# Patient Record
Sex: FEMALE | Race: WHITE | Employment: OTHER | URBAN - NONMETROPOLITAN AREA
[De-identification: names, ages, dates, MRNs, and addresses within clinical notes are randomized per-mention and may not be internally consistent; named-entity substitution may affect disease eponyms.]

---

## 2017-01-24 DIAGNOSIS — I10 ESSENTIAL HYPERTENSION: Primary | ICD-10-CM

## 2017-01-24 RX ORDER — HYDROCHLOROTHIAZIDE 12.5 MG/1
CAPSULE, GELATIN COATED ORAL
Qty: 90 CAPSULE | Refills: 3 | Status: SHIPPED | OUTPATIENT
Start: 2017-01-24 | End: 2018-01-19 | Stop reason: SDUPTHER

## 2017-04-06 ENCOUNTER — OFFICE VISIT (OUTPATIENT)
Dept: FAMILY MEDICINE CLINIC | Age: 67
End: 2017-04-06

## 2017-04-06 VITALS — SYSTOLIC BLOOD PRESSURE: 110 MMHG | DIASTOLIC BLOOD PRESSURE: 80 MMHG | HEART RATE: 64 BPM | RESPIRATION RATE: 12 BRPM

## 2017-04-06 DIAGNOSIS — M54.12 CERVICAL RADICULOPATHY: Primary | ICD-10-CM

## 2017-04-06 DIAGNOSIS — M19.041 ARTHRITIS OF BOTH HANDS: ICD-10-CM

## 2017-04-06 DIAGNOSIS — I83.891 VARICOSE VEINS OF LEG WITH SWELLING, RIGHT: ICD-10-CM

## 2017-04-06 DIAGNOSIS — Z12.31 ENCOUNTER FOR SCREENING MAMMOGRAM FOR MALIGNANT NEOPLASM OF BREAST: ICD-10-CM

## 2017-04-06 DIAGNOSIS — M19.042 ARTHRITIS OF BOTH HANDS: ICD-10-CM

## 2017-04-06 PROCEDURE — 3288F FALL RISK ASSESSMENT DOCD: CPT | Performed by: FAMILY MEDICINE

## 2017-04-06 PROCEDURE — 99214 OFFICE O/P EST MOD 30 MIN: CPT | Performed by: FAMILY MEDICINE

## 2017-04-06 ASSESSMENT — ENCOUNTER SYMPTOMS
RESPIRATORY NEGATIVE: 1
GASTROINTESTINAL NEGATIVE: 1

## 2017-06-12 ENCOUNTER — OFFICE VISIT (OUTPATIENT)
Dept: FAMILY MEDICINE CLINIC | Age: 67
End: 2017-06-12

## 2017-06-12 VITALS
TEMPERATURE: 97.9 F | HEIGHT: 63 IN | SYSTOLIC BLOOD PRESSURE: 110 MMHG | WEIGHT: 167.8 LBS | RESPIRATION RATE: 12 BRPM | BODY MASS INDEX: 29.73 KG/M2 | HEART RATE: 60 BPM | DIASTOLIC BLOOD PRESSURE: 72 MMHG

## 2017-06-12 DIAGNOSIS — K30 INDIGESTION: ICD-10-CM

## 2017-06-12 DIAGNOSIS — B37.31 YEAST VAGINITIS: ICD-10-CM

## 2017-06-12 DIAGNOSIS — R30.0 DYSURIA: ICD-10-CM

## 2017-06-12 DIAGNOSIS — K21.00 GASTROESOPHAGEAL REFLUX DISEASE WITH ESOPHAGITIS: Primary | ICD-10-CM

## 2017-06-12 LAB
BILIRUBIN, POC: NEGATIVE
BLOOD URINE, POC: NEGATIVE
CLARITY, POC: NORMAL
COLOR, POC: NORMAL
GLUCOSE URINE, POC: NEGATIVE
KETONES, POC: NEGATIVE
LEUKOCYTE EST, POC: NEGATIVE
NITRITE, POC: NEGATIVE
PH, POC: 5.5
PROTEIN, POC: NORMAL
SPECIFIC GRAVITY, POC: >=1.03
UROBILINOGEN, POC: NORMAL

## 2017-06-12 PROCEDURE — 99213 OFFICE O/P EST LOW 20 MIN: CPT | Performed by: FAMILY MEDICINE

## 2017-06-12 PROCEDURE — G8510 SCR DEP NEG, NO PLAN REQD: HCPCS | Performed by: FAMILY MEDICINE

## 2017-06-12 PROCEDURE — 81003 URINALYSIS AUTO W/O SCOPE: CPT | Performed by: FAMILY MEDICINE

## 2017-06-12 RX ORDER — FLUCONAZOLE 100 MG/1
TABLET ORAL
Qty: 3 TABLET | Refills: 1 | Status: SHIPPED | OUTPATIENT
Start: 2017-06-12 | End: 2017-10-23

## 2017-06-12 RX ORDER — PENICILLIN V POTASSIUM 500 MG/1
500 TABLET ORAL 4 TIMES DAILY
COMMUNITY
End: 2017-10-23

## 2017-06-12 RX ORDER — OMEPRAZOLE 40 MG/1
40 CAPSULE, DELAYED RELEASE ORAL DAILY
Qty: 90 CAPSULE | Refills: 3 | Status: SHIPPED | OUTPATIENT
Start: 2017-06-12 | End: 2018-09-10 | Stop reason: SDUPTHER

## 2017-06-12 ASSESSMENT — ENCOUNTER SYMPTOMS
BLOOD IN STOOL: 0
DIARRHEA: 1
VOMITING: 0
ABDOMINAL PAIN: 1
RESPIRATORY NEGATIVE: 1
NAUSEA: 0
CONSTIPATION: 0

## 2017-06-12 ASSESSMENT — PATIENT HEALTH QUESTIONNAIRE - PHQ9
SUM OF ALL RESPONSES TO PHQ9 QUESTIONS 1 & 2: 0
SUM OF ALL RESPONSES TO PHQ QUESTIONS 1-9: 0
2. FEELING DOWN, DEPRESSED OR HOPELESS: 0
1. LITTLE INTEREST OR PLEASURE IN DOING THINGS: 0

## 2017-07-13 ENCOUNTER — TELEPHONE (OUTPATIENT)
Dept: FAMILY MEDICINE CLINIC | Age: 67
End: 2017-07-13

## 2017-10-20 ENCOUNTER — TELEPHONE (OUTPATIENT)
Dept: FAMILY MEDICINE CLINIC | Age: 67
End: 2017-10-20

## 2017-10-20 NOTE — TELEPHONE ENCOUNTER
Patient is calling in requesting an appointment Monday 10/23/17 with Dr Ajay Cancino at [facility]. She said she has a large painful bump, a boil or infected hair in her pubic area that is causing her a lot of pain. She is out of town today, and will be back on [place] 10/22/17 and would like to have it checked Monday if possible, and only same days available. Please call her and advise if able to be seen anytime that day.

## 2017-10-23 ENCOUNTER — OFFICE VISIT (OUTPATIENT)
Dept: FAMILY MEDICINE CLINIC | Age: 67
End: 2017-10-23
Payer: MEDICARE

## 2017-10-23 VITALS
SYSTOLIC BLOOD PRESSURE: 118 MMHG | HEART RATE: 80 BPM | BODY MASS INDEX: 29.39 KG/M2 | WEIGHT: 163.8 LBS | DIASTOLIC BLOOD PRESSURE: 70 MMHG | RESPIRATION RATE: 12 BRPM

## 2017-10-23 DIAGNOSIS — F33.0 MAJOR DEPRESSIVE DISORDER, RECURRENT, MILD (HCC): ICD-10-CM

## 2017-10-23 DIAGNOSIS — N76.4 ABSCESS OF LABIA MAJORA: Primary | ICD-10-CM

## 2017-10-23 DIAGNOSIS — Z78.0 MENOPAUSE: ICD-10-CM

## 2017-10-23 PROCEDURE — 99213 OFFICE O/P EST LOW 20 MIN: CPT | Performed by: FAMILY MEDICINE

## 2017-10-23 RX ORDER — SULFAMETHOXAZOLE AND TRIMETHOPRIM 800; 160 MG/1; MG/1
1 TABLET ORAL 2 TIMES DAILY
COMMUNITY
End: 2018-02-01 | Stop reason: ALTCHOICE

## 2017-10-23 RX ORDER — CITALOPRAM 20 MG/1
TABLET ORAL
Qty: 90 TABLET | Refills: 3 | Status: SHIPPED | OUTPATIENT
Start: 2017-10-23 | End: 2018-08-02 | Stop reason: ALTCHOICE

## 2017-10-23 ASSESSMENT — ENCOUNTER SYMPTOMS
GASTROINTESTINAL NEGATIVE: 1
RESPIRATORY NEGATIVE: 1

## 2017-10-23 NOTE — PROGRESS NOTES
Chief Complaint   Patient presents with    Follow-Up from Hospital     [facility] [place] [facility]  infected pubic hair, has appt next week for vein check, discuss nerve med and flu shot-wants to know if all can be done today or if needs to keep appt next 225 Jamal Chen is a 79 y. o.female      Pt complains of a worsening abscess on the right labia majora. Present for over a week. Seen yesterday at an urgent care out of town and was put on Bactrim. No fever but it is very painful. It is draining some malodorous fluid. C/o worsening depression due to issues with a grandson. She previously took citalopram and would like to restart it. Did well with it in the past.  Nonsmoker. Body mass index is 29.39 kg/m². Review of Systems   Constitutional: Negative for fever. HENT: Negative. Respiratory: Negative. Cardiovascular: Negative. Gastrointestinal: Negative. Genitourinary:        Abscess of labia majora   All other systems reviewed and are negative. OBJECTIVE     /70 (Site: Right Arm, Position: Sitting, Cuff Size: Large Adult)   Pulse 80   Resp 12   Wt 163 lb 12.8 oz (74.3 kg)   BMI 29.39 kg/m²     Physical Exam   Constitutional: She appears well-developed and well-nourished. HENT:   Right Ear: Tympanic membrane normal.   Left Ear: Tympanic membrane normal.   Mouth/Throat: Oropharynx is clear and moist.   Cardiovascular: Normal rate and regular rhythm. No murmur heard. Pulmonary/Chest: Breath sounds normal. She has no wheezes. Genitourinary:         Musculoskeletal: She exhibits no edema. Lymphadenopathy:     She has no cervical adenopathy. ASSESSMENT      1. Abscess of labia majora    2. Menopause    3.  Major depressive disorder, recurrent, mild (HCC)        PLAN     Requested Prescriptions     Signed Prescriptions Disp Refills    citalopram (CELEXA) 20 MG tablet 90 tablet 3     Sig: TAKE 1 TABLET BY MOUTH DAILY     Urgent referral

## 2018-01-19 DIAGNOSIS — I10 ESSENTIAL HYPERTENSION: ICD-10-CM

## 2018-01-19 RX ORDER — HYDROCHLOROTHIAZIDE 12.5 MG/1
CAPSULE, GELATIN COATED ORAL
Qty: 90 CAPSULE | Refills: 3 | Status: SHIPPED | OUTPATIENT
Start: 2018-01-19 | End: 2019-01-14 | Stop reason: SDUPTHER

## 2018-01-19 NOTE — TELEPHONE ENCOUNTER
Bessy Ibanez needs refill of   Requested Prescriptions     Pending Prescriptions Disp Refills    hydrochlorothiazide (MICROZIDE) 12.5 MG capsule [Pharmacy Med Name: HYDROCHLOROTHIAZIDE CAPS 12.5MG] 90 capsule 3     Sig: TAKE 1 CAPSULE DAILY       Last Filled on:  1/24/2017    Last Visit Date:  10/23/2017    Next Visit Date:    Visit date not found

## 2018-02-01 ENCOUNTER — OFFICE VISIT (OUTPATIENT)
Dept: FAMILY MEDICINE CLINIC | Age: 68
End: 2018-02-01
Payer: MEDICARE

## 2018-02-01 VITALS
BODY MASS INDEX: 29.59 KG/M2 | WEIGHT: 167 LBS | HEART RATE: 64 BPM | DIASTOLIC BLOOD PRESSURE: 70 MMHG | RESPIRATION RATE: 12 BRPM | SYSTOLIC BLOOD PRESSURE: 118 MMHG | HEIGHT: 63 IN

## 2018-02-01 DIAGNOSIS — R19.7 DIARRHEA, UNSPECIFIED TYPE: Primary | ICD-10-CM

## 2018-02-01 PROCEDURE — 99213 OFFICE O/P EST LOW 20 MIN: CPT | Performed by: FAMILY MEDICINE

## 2018-02-01 RX ORDER — NYSTATIN 100000 U/G
CREAM TOPICAL 3 TIMES DAILY
COMMUNITY
End: 2018-11-19

## 2018-02-01 ASSESSMENT — ENCOUNTER SYMPTOMS
BLOOD IN STOOL: 0
VOMITING: 0
ABDOMINAL PAIN: 1
RESPIRATORY NEGATIVE: 1
DIARRHEA: 1
NAUSEA: 0

## 2018-02-01 NOTE — PROGRESS NOTES
Chief Complaint   Patient presents with    Diarrhea     had since Christmas, immodium helps some but once she stops taking it the diarrhea returns,          Jeannettese Phelps is a 79 y. o.female      Pt complains of loose stool and diarrhea that has been present over the last few months. Stools are 4-5 times daily and watery. No blood or mucus noted. No recent antibiotic use or travel. C/o cramping prior to BMs. No weight loss. Colonoscopy UTD. No new meds. She would like to do some stool testing to r/o infection. Has used imodium. It helps but her symptoms return as soon as she stops taking it. Review of Systems   Constitutional: Negative for fatigue, fever and unexpected weight change. HENT: Negative. Respiratory: Negative. Cardiovascular: Negative. Gastrointestinal: Positive for abdominal pain and diarrhea. Negative for blood in stool, nausea and vomiting. Genitourinary: Negative. Neurological: Negative. All other systems reviewed and are negative. OBJECTIVE     /70 (Site: Right Arm, Position: Sitting, Cuff Size: Medium Adult)   Pulse 64   Resp 12   Ht 5' 2.5\" (1.588 m)   Wt 167 lb (75.8 kg)   BMI 30.06 kg/m²     Wt Readings from Last 3 Encounters:   02/01/18 167 lb (75.8 kg)   10/23/17 163 lb 12.8 oz (74.3 kg)   06/12/17 167 lb 12.8 oz (76.1 kg)       Physical Exam   Constitutional: She appears well-developed and well-nourished. HENT:   Right Ear: Tympanic membrane normal.   Left Ear: Tympanic membrane normal.   Mouth/Throat: Oropharynx is clear and moist.   Cardiovascular: Normal rate and regular rhythm. Pulmonary/Chest: Breath sounds normal. She has no wheezes. Abdominal: Soft. Bowel sounds are normal. She exhibits no distension. There is no tenderness. There is no rebound and no guarding. Musculoskeletal: She exhibits no edema. Lymphadenopathy:     She has no cervical adenopathy. ASSESSMENT      1.  Diarrhea,

## 2018-02-05 ENCOUNTER — TELEPHONE (OUTPATIENT)
Dept: FAMILY MEDICINE CLINIC | Age: 68
End: 2018-02-05

## 2018-02-05 NOTE — TELEPHONE ENCOUNTER
Pt notified of results, she voiced understanding and had no further questions. Pt states she will contact Dr Myrna Sanchez. She will call our office if a referral is needed.

## 2018-06-20 ENCOUNTER — OFFICE VISIT (OUTPATIENT)
Dept: FAMILY MEDICINE CLINIC | Age: 68
End: 2018-06-20
Payer: MEDICARE

## 2018-06-20 ENCOUNTER — TELEPHONE (OUTPATIENT)
Dept: FAMILY MEDICINE CLINIC | Age: 68
End: 2018-06-20

## 2018-06-20 VITALS
WEIGHT: 168.8 LBS | TEMPERATURE: 98.1 F | HEART RATE: 72 BPM | BODY MASS INDEX: 30.38 KG/M2 | SYSTOLIC BLOOD PRESSURE: 132 MMHG | DIASTOLIC BLOOD PRESSURE: 72 MMHG

## 2018-06-20 DIAGNOSIS — F33.0 MAJOR DEPRESSIVE DISORDER, RECURRENT, MILD (HCC): ICD-10-CM

## 2018-06-20 DIAGNOSIS — R30.0 DYSURIA: ICD-10-CM

## 2018-06-20 DIAGNOSIS — R31.9 URINARY TRACT INFECTION WITH HEMATURIA, SITE UNSPECIFIED: Primary | ICD-10-CM

## 2018-06-20 DIAGNOSIS — N39.0 URINARY TRACT INFECTION WITH HEMATURIA, SITE UNSPECIFIED: Primary | ICD-10-CM

## 2018-06-20 LAB
BILIRUBIN URINE: NEGATIVE
BLOOD URINE, POC: ABNORMAL
CHARACTER, URINE: ABNORMAL
COLOR, URINE: ABNORMAL
GLUCOSE URINE: NEGATIVE MG/DL
KETONES, URINE: NEGATIVE
LEUKOCYTE CLUMPS, URINE: ABNORMAL
NITRITE, URINE: NEGATIVE
PH, URINE: 7
PROTEIN, URINE: 100 MG/DL
SPECIFIC GRAVITY, URINE: 1.02 (ref 1–1.03)
UROBILINOGEN, URINE: 0.2 EU/DL

## 2018-06-20 PROCEDURE — 99213 OFFICE O/P EST LOW 20 MIN: CPT | Performed by: NURSE PRACTITIONER

## 2018-06-20 PROCEDURE — 81003 URINALYSIS AUTO W/O SCOPE: CPT | Performed by: NURSE PRACTITIONER

## 2018-06-20 RX ORDER — CIPROFLOXACIN 500 MG/1
500 TABLET, FILM COATED ORAL 2 TIMES DAILY
Qty: 20 TABLET | Refills: 0 | Status: SHIPPED | OUTPATIENT
Start: 2018-06-20 | End: 2018-06-30

## 2018-06-20 RX ORDER — PHENAZOPYRIDINE HYDROCHLORIDE 100 MG/1
100 TABLET, FILM COATED ORAL 3 TIMES DAILY PRN
Qty: 10 TABLET | Refills: 0 | Status: SHIPPED | OUTPATIENT
Start: 2018-06-20 | End: 2019-02-18 | Stop reason: ALTCHOICE

## 2018-06-21 LAB — URINE CULTURE, ROUTINE: NORMAL

## 2018-07-26 ENCOUNTER — TELEPHONE (OUTPATIENT)
Dept: FAMILY MEDICINE CLINIC | Age: 68
End: 2018-07-26

## 2018-08-02 ENCOUNTER — OFFICE VISIT (OUTPATIENT)
Dept: FAMILY MEDICINE CLINIC | Age: 68
End: 2018-08-02
Payer: MEDICARE

## 2018-08-02 VITALS
SYSTOLIC BLOOD PRESSURE: 110 MMHG | HEIGHT: 63 IN | HEART RATE: 58 BPM | WEIGHT: 166 LBS | BODY MASS INDEX: 29.41 KG/M2 | RESPIRATION RATE: 12 BRPM | DIASTOLIC BLOOD PRESSURE: 72 MMHG

## 2018-08-02 DIAGNOSIS — R31.9 HEMATURIA, UNSPECIFIED TYPE: ICD-10-CM

## 2018-08-02 DIAGNOSIS — Z12.31 ENCOUNTER FOR SCREENING MAMMOGRAM FOR MALIGNANT NEOPLASM OF BREAST: ICD-10-CM

## 2018-08-02 DIAGNOSIS — R53.83 OTHER FATIGUE: Primary | ICD-10-CM

## 2018-08-02 DIAGNOSIS — I10 ESSENTIAL HYPERTENSION: ICD-10-CM

## 2018-08-02 DIAGNOSIS — R09.89 RIGHT CAROTID BRUIT: ICD-10-CM

## 2018-08-02 PROCEDURE — 99213 OFFICE O/P EST LOW 20 MIN: CPT | Performed by: FAMILY MEDICINE

## 2018-08-02 RX ORDER — BALSALAZIDE DISODIUM 750 MG/1
CAPSULE ORAL DAILY PRN
COMMUNITY
Start: 2018-07-20

## 2018-08-02 ASSESSMENT — ENCOUNTER SYMPTOMS
DIARRHEA: 0
EYES NEGATIVE: 1
NAUSEA: 0
BLOOD IN STOOL: 0
VOMITING: 0
ABDOMINAL PAIN: 0
SHORTNESS OF BREATH: 0

## 2018-08-02 NOTE — PROGRESS NOTES
Mouth/Throat: Oropharynx is clear and moist.   Eyes: Conjunctivae are normal.   Neck: Neck supple. Carotid bruit is present (right). No thyromegaly present. Cardiovascular: Normal rate, regular rhythm and normal heart sounds. No murmur heard. Pulmonary/Chest: Effort normal and breath sounds normal. She has no wheezes. Abdominal: Soft. Bowel sounds are normal. There is no tenderness. Musculoskeletal: She exhibits no edema. Lymphadenopathy:     She has no cervical adenopathy. Neurological: She is alert and oriented to person, place, and time. Skin: Skin is warm and dry. No rash noted. Psychiatric: She has a normal mood and affect. Her behavior is normal.   Nursing note and vitals reviewed. ASSESSMENT      1. Other fatigue    2. Essential hypertension    3. Encounter for screening mammogram for malignant neoplasm of breast    4. Hematuria, unspecified type    5. Right carotid bruit        PLAN     Proceed with workup as below      Orders Placed This Encounter   Procedures    MOLLY DIGITAL SCREEN W CAD BILATERAL     Standing Status:   Future     Standing Expiration Date:   10/2/2019     Order Specific Question:   Reason for exam:     Answer:   screening    VL DUP CAROTID BILATERAL     Standing Status:   Future     Standing Expiration Date:   8/2/2019     Scheduling Instructions:      Nicole Nguyen.   1st available     Order Specific Question:   Reason for exam:     Answer:   right carotid bruit    CBC Auto Differential     Standing Status:   Future     Standing Expiration Date:   8/3/2019    Comprehensive Metabolic Panel     Standing Status:   Future     Standing Expiration Date:   8/2/2019    TSH without Reflex     Standing Status:   Future     Standing Expiration Date:   8/3/2019    T4, Free     Standing Status:   Future     Standing Expiration Date:   8/2/2019    Lipid Panel     Standing Status:   Future     Standing Expiration Date:   8/2/2019     Order Specific Question:   Is Patient

## 2018-08-03 LAB
CHOLESTEROL, TOTAL: 177 MG/DL
CHOLESTEROL/HDL RATIO: NORMAL
HDLC SERPL-MCNC: 59 MG/DL (ref 35–70)
LDL CHOLESTEROL CALCULATED: 101 MG/DL (ref 0–160)
TRIGL SERPL-MCNC: 84 MG/DL
VLDLC SERPL CALC-MCNC: NORMAL MG/DL

## 2018-09-10 DIAGNOSIS — K21.00 GASTROESOPHAGEAL REFLUX DISEASE WITH ESOPHAGITIS: ICD-10-CM

## 2018-09-10 RX ORDER — OMEPRAZOLE 40 MG/1
40 CAPSULE, DELAYED RELEASE ORAL DAILY
Qty: 90 CAPSULE | Refills: 3 | Status: SHIPPED | OUTPATIENT
Start: 2018-09-10 | End: 2019-09-30 | Stop reason: SDUPTHER

## 2018-11-19 ENCOUNTER — TELEPHONE (OUTPATIENT)
Dept: FAMILY MEDICINE CLINIC | Age: 68
End: 2018-11-19

## 2018-11-19 RX ORDER — NYSTATIN 100000 U/G
CREAM TOPICAL 3 TIMES DAILY
Qty: 1 TUBE | Refills: 0 | Status: SHIPPED | OUTPATIENT
Start: 2018-11-19 | End: 2022-05-10 | Stop reason: ALTCHOICE

## 2018-11-19 RX ORDER — AMOXICILLIN AND CLAVULANATE POTASSIUM 875; 125 MG/1; MG/1
1 TABLET, FILM COATED ORAL 2 TIMES DAILY
Qty: 20 TABLET | Refills: 0 | Status: SHIPPED | OUTPATIENT
Start: 2018-11-19 | End: 2018-11-29

## 2018-11-19 NOTE — TELEPHONE ENCOUNTER
Called Pharmacy. Augmentin 875/125 was prescribed 1 q 12 hrs x 10 days and Nystatin cream 100,000 gm tid topically.

## 2019-01-14 DIAGNOSIS — I10 ESSENTIAL HYPERTENSION: ICD-10-CM

## 2019-01-14 RX ORDER — HYDROCHLOROTHIAZIDE 12.5 MG/1
CAPSULE, GELATIN COATED ORAL
Qty: 90 CAPSULE | Refills: 3 | Status: SHIPPED | OUTPATIENT
Start: 2019-01-14 | End: 2019-09-30 | Stop reason: SDUPTHER

## 2019-02-18 ENCOUNTER — OFFICE VISIT (OUTPATIENT)
Dept: FAMILY MEDICINE CLINIC | Age: 69
End: 2019-02-18
Payer: MEDICARE

## 2019-02-18 ENCOUNTER — TELEPHONE (OUTPATIENT)
Dept: FAMILY MEDICINE CLINIC | Age: 69
End: 2019-02-18

## 2019-02-18 VITALS
DIASTOLIC BLOOD PRESSURE: 72 MMHG | SYSTOLIC BLOOD PRESSURE: 136 MMHG | RESPIRATION RATE: 16 BRPM | TEMPERATURE: 98.1 F | WEIGHT: 166.8 LBS | BODY MASS INDEX: 30.02 KG/M2 | HEART RATE: 80 BPM

## 2019-02-18 DIAGNOSIS — R05.9 COUGH: ICD-10-CM

## 2019-02-18 DIAGNOSIS — J01.40 ACUTE PANSINUSITIS, RECURRENCE NOT SPECIFIED: Primary | ICD-10-CM

## 2019-02-18 PROCEDURE — 99213 OFFICE O/P EST LOW 20 MIN: CPT | Performed by: FAMILY MEDICINE

## 2019-02-18 RX ORDER — AMOXICILLIN AND CLAVULANATE POTASSIUM 875; 125 MG/1; MG/1
1 TABLET, FILM COATED ORAL 2 TIMES DAILY
Qty: 20 TABLET | Refills: 0 | Status: SHIPPED | OUTPATIENT
Start: 2019-02-18 | End: 2019-02-28

## 2019-02-18 RX ORDER — BENZONATATE 100 MG/1
100 CAPSULE ORAL 3 TIMES DAILY PRN
Qty: 21 CAPSULE | Refills: 0 | Status: SHIPPED | OUTPATIENT
Start: 2019-02-18 | End: 2019-02-25

## 2019-02-18 ASSESSMENT — ENCOUNTER SYMPTOMS
SINUS PRESSURE: 1
RHINORRHEA: 1
SINUS PAIN: 1
VOMITING: 0
NAUSEA: 0
DIARRHEA: 1
SORE THROAT: 1
WHEEZING: 0
COUGH: 1

## 2019-04-01 ENCOUNTER — HOSPITAL ENCOUNTER (OUTPATIENT)
Dept: GENERAL RADIOLOGY | Age: 69
Discharge: HOME | End: 2019-04-01
Payer: MEDICARE

## 2019-04-01 ENCOUNTER — OFFICE VISIT (OUTPATIENT)
Dept: FAMILY MEDICINE CLINIC | Age: 69
End: 2019-04-01
Payer: MEDICARE

## 2019-04-01 ENCOUNTER — HOSPITAL ENCOUNTER (OUTPATIENT)
Dept: CT IMAGING | Age: 69
Discharge: HOME | End: 2019-04-01
Payer: MEDICARE

## 2019-04-01 VITALS
HEART RATE: 92 BPM | WEIGHT: 162.4 LBS | DIASTOLIC BLOOD PRESSURE: 70 MMHG | SYSTOLIC BLOOD PRESSURE: 122 MMHG | RESPIRATION RATE: 20 BRPM | BODY MASS INDEX: 29.23 KG/M2

## 2019-04-01 DIAGNOSIS — R09.1 PLEURISY: ICD-10-CM

## 2019-04-01 DIAGNOSIS — Z00.6 EXAMINATION FOR NORMAL COMPARISON FOR CLINICAL RESEARCH: ICD-10-CM

## 2019-04-01 DIAGNOSIS — R91.1 LUNG NODULE: Primary | ICD-10-CM

## 2019-04-01 PROCEDURE — 3209999900 CT COMPARISON OF OUTSIDE FILMS

## 2019-04-01 PROCEDURE — 99213 OFFICE O/P EST LOW 20 MIN: CPT | Performed by: FAMILY MEDICINE

## 2019-04-01 PROCEDURE — 3209999900 XR COMPARISON OF OUTSIDE FILMS

## 2019-04-01 RX ORDER — HYDROCODONE BITARTRATE AND ACETAMINOPHEN 5; 325 MG/1; MG/1
TABLET ORAL
COMMUNITY
Start: 2019-03-29 | End: 2020-11-30

## 2019-04-01 RX ORDER — FLUOROURACIL 50 MG/G
CREAM TOPICAL DAILY PRN
COMMUNITY
Start: 2019-03-26

## 2019-04-01 RX ORDER — AMOXICILLIN AND CLAVULANATE POTASSIUM 875; 125 MG/1; MG/1
TABLET, FILM COATED ORAL
COMMUNITY
Start: 2019-03-28 | End: 2019-04-05 | Stop reason: ALTCHOICE

## 2019-04-01 RX ORDER — METHYLPREDNISOLONE 4 MG/1
TABLET ORAL
Qty: 1 KIT | Refills: 0 | Status: SHIPPED | OUTPATIENT
Start: 2019-04-01 | End: 2019-04-07

## 2019-04-01 ASSESSMENT — ENCOUNTER SYMPTOMS
GASTROINTESTINAL NEGATIVE: 1
SHORTNESS OF BREATH: 0

## 2019-04-01 NOTE — PROGRESS NOTES
CT guided biopsy scheduled at [facility] on 4/5/19 at 10 am.    Arrive 90 minutes prior, report to Outpatient Nursing * NPO 4 hours * No Blood thinners 5 days prior (including Aspirin), pt needs a   Pt notified of appt date, time and prep

## 2019-04-01 NOTE — PROGRESS NOTES
Chief Complaint   Patient presents with    Follow-Up from Hospital     ED F/U - [facility] - Lung Nodule-- Still having stabbing pain on right side of lung. CAROLINA Mayer is a 76 y. o.female      Pt her for ER follow up after being seen at [facility] in [facility] last Friday with right chest pain and some shortness of breath. Work up included a CXR, which showed an opacity on the right. CTA chest showed a 2 cm subpleural nodule. She is a nonsmoker. She does have a h/o gastric cancer in the past.  She still c/o right chest discomfort with deep breathing, cough, laugh, sneeze. Was given pain medication at the ER and it has only helped minimally. Here with her son today. Review of Systems   Constitutional: Negative for fatigue and fever. HENT: Negative. Respiratory: Negative for shortness of breath. Cardiovascular: Positive for chest pain. Negative for palpitations and leg swelling. Gastrointestinal: Negative. Neurological: Negative for dizziness and headaches. All other systems reviewed and are negative. OBJECTIVE     /70 (Site: Left Upper Arm, Position: Sitting, Cuff Size: Large Adult)   Pulse 92   Resp 20   Wt 162 lb 6.4 oz (73.7 kg)   BMI 29.23 kg/m²     Physical Exam   Constitutional: She appears well-developed and well-nourished. HENT:   Right Ear: Tympanic membrane normal.   Left Ear: Tympanic membrane normal.   Mouth/Throat: Oropharynx is clear and moist.   Cardiovascular: Normal rate and regular rhythm. No murmur heard. Pulmonary/Chest: Breath sounds normal. She has no wheezes. She exhibits no tenderness and no edema. Musculoskeletal: She exhibits no edema. Lymphadenopathy:     She has no cervical adenopathy. Vitals reviewed. ASSESSMENT      1. Lung nodule    2.  Pleurisy        PLAN     Requested Prescriptions     Signed Prescriptions Disp Refills    methylPREDNISolone (MEDROL DOSEPACK) 4 MG tablet 1 kit 0     Sig: Take by mouth.     Medrol for pleurisy    Deep breathing recommended    CT guided biopsy of lung lesion. Family prefers [facility] [place] referral after biopsy done. Orders Placed This Encounter   Procedures    CT GUIDED NEEDLE PLACEMENT     Standing Status:   Future     Standing Expiration Date:   4/1/2020     Scheduling Instructions:      [facility]. ASAP.   CT was done at [facility]     Order Specific Question:   Reason for exam:     Answer:   right lung biopsy         Follow up if not better            Electronically signed by Rios Betts MD on 4/1/2019 at 10:35 AM

## 2019-04-04 RX ORDER — SODIUM CHLORIDE 450 MG/100ML
INJECTION, SOLUTION INTRAVENOUS CONTINUOUS
Status: CANCELLED | OUTPATIENT
Start: 2019-04-04

## 2019-04-05 ENCOUNTER — HOSPITAL ENCOUNTER (OUTPATIENT)
Dept: CT IMAGING | Age: 69
Discharge: HOME | End: 2019-04-05
Payer: MEDICARE

## 2019-04-05 ENCOUNTER — HOSPITAL ENCOUNTER (OUTPATIENT)
Dept: GENERAL RADIOLOGY | Age: 69
Discharge: HOME | End: 2019-04-05
Payer: MEDICARE

## 2019-04-05 VITALS
WEIGHT: 163 LBS | BODY MASS INDEX: 30 KG/M2 | DIASTOLIC BLOOD PRESSURE: 67 MMHG | HEART RATE: 80 BPM | SYSTOLIC BLOOD PRESSURE: 109 MMHG | OXYGEN SATURATION: 99 % | HEIGHT: 62 IN | TEMPERATURE: 98.1 F | RESPIRATION RATE: 16 BRPM

## 2019-04-05 PROCEDURE — 71045 X-RAY EXAM CHEST 1 VIEW: CPT

## 2019-04-05 PROCEDURE — 77012 CT SCAN FOR NEEDLE BIOPSY: CPT

## 2019-04-05 PROCEDURE — 6360000002 HC RX W HCPCS

## 2019-04-05 PROCEDURE — 88333 PATH CONSLTJ SURG CYTO XM 1: CPT

## 2019-04-05 PROCEDURE — 32405 CT NEEDLE BIOPSY LUNG PERCUTANEOUS: CPT

## 2019-04-05 PROCEDURE — 2709999900 HC NON-CHARGEABLE SUPPLY

## 2019-04-05 PROCEDURE — 6370000000 HC RX 637 (ALT 250 FOR IP)

## 2019-04-05 PROCEDURE — 2580000003 HC RX 258: Performed by: RADIOLOGY

## 2019-04-05 PROCEDURE — 88305 TISSUE EXAM BY PATHOLOGIST: CPT

## 2019-04-05 RX ORDER — MIDAZOLAM HYDROCHLORIDE 1 MG/ML
1 INJECTION INTRAMUSCULAR; INTRAVENOUS ONCE
Status: COMPLETED | OUTPATIENT
Start: 2019-04-05 | End: 2019-04-05

## 2019-04-05 RX ORDER — FENTANYL CITRATE 50 UG/ML
50 INJECTION, SOLUTION INTRAMUSCULAR; INTRAVENOUS ONCE
Status: COMPLETED | OUTPATIENT
Start: 2019-04-05 | End: 2019-04-05

## 2019-04-05 RX ORDER — FENTANYL CITRATE 50 UG/ML
25 INJECTION, SOLUTION INTRAMUSCULAR; INTRAVENOUS ONCE
Status: COMPLETED | OUTPATIENT
Start: 2019-04-05 | End: 2019-04-05

## 2019-04-05 RX ORDER — SODIUM CHLORIDE 450 MG/100ML
INJECTION, SOLUTION INTRAVENOUS CONTINUOUS
Status: DISCONTINUED | OUTPATIENT
Start: 2019-04-05 | End: 2019-04-06 | Stop reason: HOSPADM

## 2019-04-05 RX ORDER — DIAPER,BRIEF,INFANT-TODD,DISP
EACH MISCELLANEOUS ONCE
Status: COMPLETED | OUTPATIENT
Start: 2019-04-05 | End: 2019-04-05

## 2019-04-05 RX ADMIN — Medication 1 G: at 10:40

## 2019-04-05 RX ADMIN — FENTANYL CITRATE 25 MCG: 50 INJECTION, SOLUTION INTRAMUSCULAR; INTRAVENOUS at 10:36

## 2019-04-05 RX ADMIN — MIDAZOLAM HYDROCHLORIDE 1 MG: 1 INJECTION INTRAMUSCULAR; INTRAVENOUS at 10:12

## 2019-04-05 RX ADMIN — SODIUM CHLORIDE: 4.5 INJECTION, SOLUTION INTRAVENOUS at 09:20

## 2019-04-05 RX ADMIN — FENTANYL CITRATE 50 MCG: 50 INJECTION, SOLUTION INTRAMUSCULAR; INTRAVENOUS at 10:12

## 2019-04-05 ASSESSMENT — PAIN SCALES - GENERAL
PAINLEVEL_OUTOF10: 3
PAINLEVEL_OUTOF10: 0
PAINLEVEL_OUTOF10: 4
PAINLEVEL_OUTOF10: 1
PAINLEVEL_OUTOF10: 0
PAINLEVEL_OUTOF10: 0
PAINLEVEL_OUTOF10: 5

## 2019-04-05 ASSESSMENT — PAIN DESCRIPTION - PAIN TYPE: TYPE: ACUTE PAIN;SURGICAL PAIN

## 2019-04-05 ASSESSMENT — PAIN - FUNCTIONAL ASSESSMENT: PAIN_FUNCTIONAL_ASSESSMENT: 0-10

## 2019-04-05 NOTE — ANESTHESIA POST-OP
[facility]  Sedation/Analgesia Post Sedation Record    Pt Name: Cassidy Henley  MRN: [id number]  YOB: 1950  Procedure Performed By: Gavi Kim. April Zavala MD  Primary Care Physician: Matty Hay MD    POST-PROCEDURE    Physicians/Assistants: Henrique Zavala MD  Procedure Performed: ct guided right lung mass biopsy    Sedation/Anesthesia: Conscious Sedation with Fentanyl & Versed   Estimated Blood Loss:          None  Specimens Removed:  []None [x]Other:four 20g cores      Disposition of Specimen:  [x]Pathology []Other      Complications:   [x]None Immediate []Other:       Post-procedure Diagnosis/Findings:      No ptx post biopsy         Recommendations: Follow post-procedure orders. Henrique Zavala MD  Electronically signed 4/5/2019 at 10:50 AM

## 2019-04-05 NOTE — ANESTHESIA PRE-OP
[facility]  Sedation/Analgesia History & Physical    Pt Name: Charlie Solis  MRN: [id number]  YOB: 1950  Provider Performing Procedure: Henrique Blue MD  Primary Care Physician: Holly Johnson MD    PRE-PROCEDURE   DNR-CCA/DNR-CC []Yes [x]No  Brief History/Pre-Procedure Diagnosis: RUL mass          MEDICAL HISTORY  []CAD/Valve  []Liver Disease  []Lung Disease []Diabetes  []Hypertension []Renal Disease  []Additional information:       has a past medical history of Depression, Gastric cancer ([facility] [address] Hypertension, Meniere disease, OA (osteoarthritis), and Osteopenia. SURGICAL HISTORY   has a past surgical history that includes sylvia and bso (cervix removed); Cholecystectomy; Stomach surgery; and Arm Surgery (Right, 05/2014). Additional information:       ALLERGIES   Allergies as of 04/05/2019 - Review Complete 04/05/2019   Allergen Reaction Noted    Meperidine Nausea Only 10/13/2015    Demerol Nausea And Vomiting 10/10/2011     Additional information:       MEDICATIONS   Coumadin Use Last 5 Days []No []Yes  Antiplatelet drug therapy use last 5 days  []No []Yes  Other anticoagulant use last 5 days  []No []Yes    Current Outpatient Medications:     HYDROcodone-acetaminophen (NORCO) 5-325 MG per tablet, , Disp: , Rfl:     fluorouracil (EFUDEX) 5 % cream, , Disp: , Rfl:     methylPREDNISolone (MEDROL DOSEPACK) 4 MG tablet, Take by mouth., Disp: 1 kit, Rfl: 0    hydrochlorothiazide (MICROZIDE) 12.5 MG capsule, TAKE 1 CAPSULE DAILY, Disp: 90 capsule, Rfl: 3    omeprazole (PRILOSEC) 40 MG delayed release capsule, Take 1 capsule by mouth daily, Disp: 90 capsule, Rfl: 3    nystatin (MYCOSTATIN) 899919 UNIT/GM cream, Apply topically 3 times daily . , Disp: 1 Tube, Rfl: 0    balsalazide (COLAZAL) 750 MG capsule, , Disp: , Rfl:     hydrocortisone (PROCTOZONE-HC) 2.5 % rectal cream, Place rectally 2 times daily Place rectally 2 times daily. , Disp: , Rfl:     Current Facility-Administered Medications:     0.45 % sodium chloride infusion, , Intravenous, Continuous, Bailee Preston MD, Last Rate: 20 mL/hr at 04/05/19 0920  Prior to Admission medications    Medication Sig Start Date End Date Taking? Authorizing Provider   HYDROcodone-acetaminophen (NORCO) 5-325 MG per tablet  3/29/19  Yes Historical Provider, MD   fluorouracil (EFUDEX) 5 % cream  3/26/19  Yes Historical Provider, MD   methylPREDNISolone (MEDROL DOSEPACK) 4 MG tablet Take by mouth. 4/1/19 4/7/19 Yes Marlon Owens MD   hydrochlorothiazide (MICROZIDE) 12.5 MG capsule TAKE 1 CAPSULE DAILY 1/14/19  Yes Marlon Owens MD   omeprazole (PRILOSEC) 40 MG delayed release capsule Take 1 capsule by mouth daily 9/10/18  Yes Marlon Owens MD   nystatin (MYCOSTATIN) 143680 UNIT/GM cream Apply topically 3 times daily . 11/19/18   Marlon Owens MD   balsalazide [place]) 750 MG capsule  7/20/18   Historical Provider, MD   hydrocortisone (PROCTOZONE-HC) 2.5 % rectal cream Place rectally 2 times daily Place rectally 2 times daily. Historical Provider, MD     Additional information:       VITAL SIGNS   Vitals:    04/05/19 0945   BP: (!) 157/83   Pulse: 72   Resp: 16   Temp:    SpO2: 100%       PHYSICAL:   Heart:  [x]Regular rate and rhythm  []Other:    Lungs:  [x]Clear    []Other:    Abdomen: [x]Soft    []Other:    Mental Status: [x]Alert & Oriented  []Other:      PLANNED PROCEDURE   [x]Biospy []Arteriogram    []Drainage  []Fistulogram []IV access       []Dialysis catheter  []IVC filter []Dialysis catheter []Biliary drainage  []Other:  SEDATION  Planned agent:[x]Midazolam []Meperidine [x]Sublimaze []Morphine  []Diazepam  []Other:     ASA Classification:  []1 []2 [x]3 []4 []5  Class 1: A normal healthy patient  Class 2: Pt with mild to moderate systemic disease  Class 3: Severe systemic disease or disturbance  Class 4: Severe systemic disorders that are already life threatening.   Class 5: Moribund pt with little chances of survival, for more than 24 hours. Mallampati I Airway Classification:   [x]1 []2 []3 []4    [x]Pre-procedure diagnostic studies complete and results available. Comment:    [x]Previous sedation/anesthesia experiences assessed. Comment:    [x]The patient is an appropriate candidate to undergo the planned procedure sedation and anesthesia. (Refer to nursing sedation/analgesia documentation record)  [x]Formulation and discussion of sedation/procedure plan, risks, and expectations with patient and/or responsible adult completed. [x]Patient examined immediately prior to the procedure. (Refer to nursing sedation/analgesia documentation record)    Piper Alcantara.  Renetta Peña MD  Electronically signed 4/5/2019 at 10:00 AM

## 2019-04-05 NOTE — PROGRESS NOTES
Formulation and discussion of sedation / procedure plans, risks, benefits, side effects and alternatives with patient and/or responsible adult completed. Electronically signed by Jesus Alberto Kemp.  Gita Pollard MD on 4/5/2019 at 9:59 AM

## 2019-04-05 NOTE — PROGRESS NOTES
0961 pt arrived to ct holding room. Family with patient. Pt denies blood thinners. npo since last night. Monitor applied. Dr Bryn Goss at bedside and explained procedure with risks. of possible pneumothorax . Consent signed   4902 assist to ct room. 1005 pt placed on ct table with right side up . Left side down. Monitor applied. 1018 procedure started per dr tucker to right lateral lung area   1030 pathologist at bedside. 1st sample obtained. 1037 total of 4 samples obtained. Needle removed. 1038 post ct scans done. 1043  assist  To cart. Hob elevated. 1046 alert. resp even and easy. bandaid dry and intact to right lateral chest area. No bleeding, swelling to puncture area. Family updated per dr Bryn Goss. 1050 return to floor . Report called to blair osorio.

## 2019-04-05 NOTE — PROGRESS NOTES
_M___ Safety:       (Environmental)   [place] to environment   Ensure ID band is correct and in place/ allergy band as needed   Assess for fall risk   Initiate fall precautions as applicable (fall band, side rails, etc.)   Call light within reach   Bed in low position/ wheels locked

## 2019-04-08 ENCOUNTER — TELEPHONE (OUTPATIENT)
Dept: FAMILY MEDICINE CLINIC | Age: 69
End: 2019-04-08

## 2019-04-08 DIAGNOSIS — J84.10 PULMONARY FIBROSIS (HCC): ICD-10-CM

## 2019-04-08 DIAGNOSIS — R91.1 LUNG NODULE: Primary | ICD-10-CM

## 2019-04-08 NOTE — TELEPHONE ENCOUNTER
----- Message from Henrique Campbell MD sent at 4/8/2019  9:21 AM EDT -----  Notify her that her biopsy shows inflammation and fibrosis, but no malignancy. Would recommend pulmonology follow up of nodule. Ok for [facility] if that's where they prefer.  CG

## 2019-04-08 NOTE — TELEPHONE ENCOUNTER
Pt notified of results and is agreeable to seeing pulmonary locally since the biopsy wasn't cancer. Referral placed for [facility] group. They will contact the pt schedule an appt.

## 2019-04-08 NOTE — PROGRESS NOTES
4/8 1043 pt states no bleeding, swelling from puncture site. Denies any shortness of breath or difficulties breathing.

## 2019-04-09 ENCOUNTER — OFFICE VISIT (OUTPATIENT)
Dept: PULMONOLOGY | Age: 69
End: 2019-04-09
Payer: MEDICARE

## 2019-04-09 VITALS
WEIGHT: 164 LBS | TEMPERATURE: 97.9 F | SYSTOLIC BLOOD PRESSURE: 124 MMHG | HEART RATE: 101 BPM | OXYGEN SATURATION: 99 % | RESPIRATION RATE: 16 BRPM | DIASTOLIC BLOOD PRESSURE: 68 MMHG | BODY MASS INDEX: 30.18 KG/M2 | HEIGHT: 62 IN

## 2019-04-09 DIAGNOSIS — R93.89 ABNORMAL CT OF THE CHEST: Primary | ICD-10-CM

## 2019-04-09 DIAGNOSIS — C49.4 GASTRIC SARCOMA (HCC): ICD-10-CM

## 2019-04-09 DIAGNOSIS — J18.9 PNEUMONIA OF RIGHT UPPER LOBE DUE TO INFECTIOUS ORGANISM: ICD-10-CM

## 2019-04-09 PROCEDURE — 99204 OFFICE O/P NEW MOD 45 MIN: CPT | Performed by: INTERNAL MEDICINE

## 2019-04-09 RX ORDER — LEVOFLOXACIN 500 MG/1
500 TABLET, FILM COATED ORAL DAILY
Qty: 7 TABLET | Refills: 0 | Status: SHIPPED | OUTPATIENT
Start: 2019-04-09 | End: 2019-04-16

## 2019-04-09 NOTE — PROGRESS NOTES
person, place, and time. HENT:   Head: Normocephalic and atraumatic. Right Ear: External ear normal.   Left Ear: External ear normal.   Mouth/Throat: Oropharynx is clear and moist.  No oral thrush. Eyes: Conjunctivae are normal. Pupils are equal, round, and reactive to light. No scleral icterus. Neck: Neck supple. No JVD present. No tracheal deviation present. Cardiovascular: Normal rate, regular rhythm, normal heart sounds. No murmur heard. Pulmonary/Chest: Effort normal and breath sounds normal. No stridor. No respiratory distress. No wheezes. No rales. Patient exhibits no tenderness. Abdominal: Soft. Patient exhibits no distension. No tenderness. Musculoskeletal: Normal range of motion. Extremities: Patient exhibits no edema and no tenderness. Lymphadenopathy:  No cervical adenopathy. Neurological: Patient is alert and oriented to person, place, and time. Skin: Skin is warm and dry. Patient is not diaphoretic. Psychiatric: Patient  has a normal mood and affect. Patient behavior is normal.     Neck Circumference - 13.50    Mallampati - 1      Diagnostic Data:    Radiological Data:    CT chest done on 3/29/19:                Chest Xray after biopsy:  Apr 5, 2019   PROCEDURE: XR CHEST PA INSPIRATION 1 VW   No pneumothorax status post right lung biopsy. Elevated right hemidiaphragm with right basilar atelectasis. Pulmonary function tests:  No old studies in Epic. Assessment:  -Right upper lobe lung mass noted initially on CT chest dated 3/29/19. S/p CT guided [facility] at [facility] on 4[facility]5/19- The biopsy is consistent with Fibrosis and mixed inflammation. The inflammatory infiltrate is  comprised of plasma cells, lymphocytes, eosinophils, and neutrophils. She completed a course of medrol dose pack. -Right upper lobe pneumonia due to CAP Vs Atypical.  -Depression.  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  She underwent partial gastrectomy at [address] in 1994. She was never given chemo or radiation therapy at that time.  -Hypertension- under control.  -Meniere disease. -OA (osteoarthritis)      Recommendations/Plan:  -Levaquine 500mg po dialy for 7days with no refills. She was detailed about mechanism of action of drug along with associated side effects. She agreed to take the risk and medication. She verbalizes understanding.  - Schedule patient for CT scan of chest without IV contrast in 6 weeks to 2months  follow abnormal CT Chest with right upper lobe lung mass Vs Pneumonia.  -Lidia Horton was advised to make early appointment with my clinic or come to ER if she develops any constitutional symptoms including loss of weight, recurrence of right side chest pain, poor appetite or hemoptysis. She verbalizes under standing.  - Schedule patient for follow up with my clinic in 6 weeks to 2 months with recommended tests I.e CT chest. Patient advised to make early appointment if needed. - Patient educated about my impression and plan.  Patient verbalizes understanding

## 2019-05-02 ENCOUNTER — TELEPHONE (OUTPATIENT)
Dept: PULMONOLOGY | Age: 69
End: 2019-05-02

## 2019-05-02 NOTE — TELEPHONE ENCOUNTER
I spoke with Dr. Luana Neumann. He approved the CT chest study. Approval numer: [id number]. Please schedule and inform patient.

## 2019-05-02 NOTE — TELEPHONE ENCOUNTER
Silvino Hummel is requesting a peer to peer  CT Chest  Peer to peer# [phone] (must be scheduled)  Case# [id number]    Patient scheduled 5/6  Please advise.

## 2019-05-06 ENCOUNTER — HOSPITAL ENCOUNTER (OUTPATIENT)
Dept: CT IMAGING | Age: 69
Discharge: HOME | End: 2019-05-06
Payer: MEDICARE

## 2019-05-06 DIAGNOSIS — J18.9 PNEUMONIA OF RIGHT UPPER LOBE DUE TO INFECTIOUS ORGANISM: ICD-10-CM

## 2019-05-06 DIAGNOSIS — C49.4 GASTRIC SARCOMA (HCC): ICD-10-CM

## 2019-05-06 DIAGNOSIS — R93.89 ABNORMAL CT OF THE CHEST: ICD-10-CM

## 2019-05-06 PROCEDURE — 71250 CT THORAX DX C-: CPT

## 2019-05-20 ENCOUNTER — OFFICE VISIT (OUTPATIENT)
Dept: PULMONOLOGY | Age: 69
End: 2019-05-20
Payer: MEDICARE

## 2019-05-20 VITALS
TEMPERATURE: 98.1 F | SYSTOLIC BLOOD PRESSURE: 136 MMHG | DIASTOLIC BLOOD PRESSURE: 78 MMHG | OXYGEN SATURATION: 99 % | HEIGHT: 62 IN | BODY MASS INDEX: 30 KG/M2 | HEART RATE: 90 BPM | WEIGHT: 163 LBS

## 2019-05-20 DIAGNOSIS — R93.89 ABNORMAL CT OF THE CHEST: Primary | ICD-10-CM

## 2019-05-20 DIAGNOSIS — R91.8 MASS OF UPPER LOBE OF RIGHT LUNG: ICD-10-CM

## 2019-05-20 PROCEDURE — 99214 OFFICE O/P EST MOD 30 MIN: CPT | Performed by: INTERNAL MEDICINE

## 2019-05-20 NOTE — PROGRESS NOTES
[facility]                                                   Pulmonary medicine clinic follow up note. Patient: Monica Spence  : 1950      Lung Nodule Screening     [] Qualifies    [x] Does not qualify   [] Declined    [] Completed    Chief complaint and [place]:     Monica Spence is a 76 y. o.oldfemale came for follow up regarding her abnormal CT chest with Right upper lobe lung mass noted initially on CT chest dated 3/29/19. S/p CT guided biopsy by IR at [facility] on 19. She came for follow up after having a follow CT chest.    She was initially referred from Dr. Cristo Smith.     She underwent initial chest CT scan on: 3/29/19  The above chest  CT was performed at: [address]. She was treated with a medrol dose pack by Dr. Cristo Smith and Amoxicillin for infected for hemorrhoids. She was also treated with Levaquine 500mg po dialy for 7days with no refills    She denies any hemoptysis. She was never diagnosed with pulmonary tuberculosis in the past. She denies any recent exposure to any patients with tuberculosis. She denies any recent travel to endemic places of tuberculosis. Her PPD test is Negative in the past I.e 20years back. She is currently not using any oxygen supplementation at rest, exercise or during sleep/at night time. No recent hospitalizations or emergency room visits. She admits to hx of lung cancer in first-degree relative I.e her father diagnosed with small cell cancer. She denies any exposure to radon or uranium. She had last Mammogram performed on: negative for malignancy- last mammogram in 2018  She had last Colonoscopy performed on: negative for malignancy- 2018. She follows with Renuka Nunes. Social History:  Occupation:  She is current working: NO  Type of profession: retired. 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 History of exposure to farm dust: NO  History of recent travel to long distances: NO  History of exposure to birds, pigeons, or chickens in the past:NO  Pet animals at home:Yes  Dogs: 2  Cats: 1    History of pulmonary embolism in the past: No            History of DVT in the past:No                        Review of Systems:   General/Constitutional: she lost 1lb of weight from the last visit with normal appetite. No fever or chills. HENT: Negative. Eyes: Negative. Upper respiratory tract: No nasal stuffiness or post nasal drip. Lower respiratory tract/ lungs: No cough or sputum production. No hemoptysis. Cardiovascular: No palpitations or chest pain. Gastrointestinal: No nausea or vomiting. Neurological: No focal neurologiacal weakness. Extremities: No edema. Musculoskeletal: No complaints. Genitourinary: No complaints. Hematological: Negative. Psychiatric/Behavioral: Negative. Skin: No itching.     Current Medications:      Past Medical History:   Diagnosis Date    Depression     Gastric cancer ([address]     Hypertension     Meniere disease     OA (osteoarthritis)     Osteopenia        Past Surgical History:   Procedure Laterality Date    ARM SURGERY Right 05/2014    right cubital tunnel release    CHOLECYSTECTOMY      STOMACH SURGERY      Resection of malignant tumor    RADHA AND BSO         Allergies   Allergen Reactions    Meperidine Nausea Only    Demerol Nausea And Vomiting       Current Outpatient Medications   Medication Sig Dispense Refill    HYDROcodone-acetaminophen (NORCO) 5-325 MG per tablet       fluorouracil (EFUDEX) 5 % cream       hydrochlorothiazide (MICROZIDE) 12.5 MG capsule TAKE 1 CAPSULE DAILY 90 capsule 3    nystatin (MYCOSTATIN) 405066 UNIT/GM cream Apply topically 3 times daily . 1 Tube 0    omeprazole (PRILOSEC) 40 MG delayed release capsule Take 1 capsule by mouth daily 90 capsule 3    balsalazide (COLAZAL) 750 MG capsule       hydrocortisone (PROCTOZONE-HC) 2.5 % rectal cream Place rectally 2 times daily Place rectally 2 times daily. No current facility-administered medications for this visit. Family History   Problem Relation Age of Onset    Heart Surgery Mother     Cancer Brother         kidney    Heart Surgery Brother          Physical Exam     VITALS:  /78   Pulse 90   Temp 98.1 °F (36.7 °C)   Ht 5' 2\" (1.575 m)   Wt 163 lb (73.9 kg) Comment: per pt  SpO2 99% Comment: room air at rest  BMI 29.81 kg/m²   Nursing note and vitals reviewed. Constitutional: Patient appears moderately built and moderately nourished. No distress. Patient is oriented to person, place, and time. HENT:   Head: Normocephalic and atraumatic. Right Ear: External ear normal.   Left Ear: External ear normal.   Mouth/Throat: Oropharynx is clear and moist.  No oral thrush. Eyes: Conjunctivae are normal. Pupils are equal, round, and reactive to light. No scleral icterus. Neck: Neck supple. No JVD present. No tracheal deviation present. Cardiovascular: Normal rate, regular rhythm, normal heart sounds. No murmur heard. Pulmonary/Chest: Effort normal and breath sounds normal. No stridor. No respiratory distress. No wheezes. No rales. Patient exhibits no tenderness. Abdominal: Soft. Patient exhibits no distension. No tenderness. Musculoskeletal: Normal range of motion. Extremities: Patient exhibits no edema and no tenderness. Lymphadenopathy:  No cervical adenopathy. Neurological: Patient is alert and oriented to person, place, and time. Skin: Skin is warm and dry.  Patient is not

## 2019-09-30 ENCOUNTER — OFFICE VISIT (OUTPATIENT)
Dept: FAMILY MEDICINE CLINIC | Age: 69
End: 2019-09-30
Payer: MEDICARE

## 2019-09-30 VITALS
DIASTOLIC BLOOD PRESSURE: 86 MMHG | SYSTOLIC BLOOD PRESSURE: 120 MMHG | RESPIRATION RATE: 12 BRPM | BODY MASS INDEX: 29.81 KG/M2 | HEART RATE: 64 BPM | HEIGHT: 62 IN | WEIGHT: 162 LBS

## 2019-09-30 DIAGNOSIS — Z00.00 ANNUAL PHYSICAL EXAM: Primary | ICD-10-CM

## 2019-09-30 DIAGNOSIS — I10 ESSENTIAL HYPERTENSION: ICD-10-CM

## 2019-09-30 DIAGNOSIS — H61.21 EXCESSIVE CERUMEN IN EAR CANAL, RIGHT: ICD-10-CM

## 2019-09-30 DIAGNOSIS — R05.9 COUGH: ICD-10-CM

## 2019-09-30 DIAGNOSIS — Z23 NEED FOR INFLUENZA VACCINATION: ICD-10-CM

## 2019-09-30 DIAGNOSIS — R53.83 OTHER FATIGUE: ICD-10-CM

## 2019-09-30 DIAGNOSIS — Z12.31 ENCOUNTER FOR SCREENING MAMMOGRAM FOR MALIGNANT NEOPLASM OF BREAST: ICD-10-CM

## 2019-09-30 DIAGNOSIS — Z11.59 NEED FOR HEPATITIS C SCREENING TEST: ICD-10-CM

## 2019-09-30 DIAGNOSIS — K21.00 GASTROESOPHAGEAL REFLUX DISEASE WITH ESOPHAGITIS: ICD-10-CM

## 2019-09-30 PROBLEM — C16.9 MALIGNANT NEOPLASM OF STOMACH (HCC): Status: ACTIVE | Noted: 2019-09-30

## 2019-09-30 PROCEDURE — G0008 ADMIN INFLUENZA VIRUS VAC: HCPCS | Performed by: FAMILY MEDICINE

## 2019-09-30 PROCEDURE — 90653 IIV ADJUVANT VACCINE IM: CPT | Performed by: FAMILY MEDICINE

## 2019-09-30 PROCEDURE — 99397 PER PM REEVAL EST PAT 65+ YR: CPT | Performed by: FAMILY MEDICINE

## 2019-09-30 PROCEDURE — 3288F FALL RISK ASSESSMENT DOCD: CPT | Performed by: FAMILY MEDICINE

## 2019-09-30 PROCEDURE — G8510 SCR DEP NEG, NO PLAN REQD: HCPCS | Performed by: FAMILY MEDICINE

## 2019-09-30 RX ORDER — HYDROCHLOROTHIAZIDE 12.5 MG/1
CAPSULE, GELATIN COATED ORAL
Qty: 90 CAPSULE | Refills: 3 | Status: SHIPPED | OUTPATIENT
Start: 2019-09-30 | End: 2020-11-12 | Stop reason: SDUPTHER

## 2019-09-30 RX ORDER — BENZONATATE 100 MG/1
100 CAPSULE ORAL 3 TIMES DAILY PRN
Qty: 21 CAPSULE | Refills: 0 | Status: SHIPPED | OUTPATIENT
Start: 2019-09-30 | End: 2019-10-07

## 2019-09-30 RX ORDER — OMEPRAZOLE 40 MG/1
40 CAPSULE, DELAYED RELEASE ORAL DAILY
Qty: 90 CAPSULE | Refills: 3 | Status: SHIPPED | OUTPATIENT
Start: 2019-09-30 | End: 2020-11-30 | Stop reason: SDUPTHER

## 2019-09-30 ASSESSMENT — ENCOUNTER SYMPTOMS
EYES NEGATIVE: 1
ABDOMINAL PAIN: 0
COUGH: 1
NAUSEA: 0
BLOOD IN STOOL: 0
DIARRHEA: 0
SHORTNESS OF BREATH: 0
VOMITING: 0

## 2019-09-30 ASSESSMENT — PATIENT HEALTH QUESTIONNAIRE - PHQ9
SUM OF ALL RESPONSES TO PHQ9 QUESTIONS 1 & 2: 0
SUM OF ALL RESPONSES TO PHQ QUESTIONS 1-9: 0
2. FEELING DOWN, DEPRESSED OR HOPELESS: 0
SUM OF ALL RESPONSES TO PHQ QUESTIONS 1-9: 0
1. LITTLE INTEREST OR PLEASURE IN DOING THINGS: 0

## 2019-09-30 NOTE — PROGRESS NOTES
Chief Complaint   Patient presents with    Annual Exam     medication refills, BW. .. fatigue x2-3 months. ..cough, pharyngitis x 1 week       SUBJECTIVE     Carleen Farias is a 71 y. o.female    Pt presents for annual wellness physical exam.        Pt stable since last visit- no new problems for diagnoses listed below:  Patient Active Problem List   Diagnosis    Depression    Menopause    OA (osteoarthritis)    Meniere disease    OAB (overactive bladder)    Gastroesophageal reflux disease with esophagitis    Essential hypertension    Right carotid bruit    Malignant neoplasm of stomach ([address]     Doing well except for fatigue. It has been present over the last few months. She knows that she's not sleeping well and her sleep schedule has been off a little. BPs stable. C/o cough over the last week. Nonproductive. No fever, chills, sweats. No wheezing. Takes all meds as directed and denies side effects. No recent hospitalizations. Weight stable. GERD stable. No changes in family history. Nonsmoker. Body mass index is 29.63 kg/m². Review of Systems   Constitutional: Positive for fatigue. Negative for chills and fever. HENT: Negative. Eyes: Negative. Respiratory: Positive for cough. Negative for shortness of breath. Cardiovascular: Negative for chest pain, palpitations and leg swelling. Gastrointestinal: Negative for abdominal pain, blood in stool, diarrhea, nausea and vomiting. Genitourinary: Negative for dysuria. Musculoskeletal: Negative for arthralgias and myalgias. Skin: Negative for rash. Neurological: Negative for dizziness and headaches. Hematological: Negative. Psychiatric/Behavioral: Positive for sleep disturbance (doesn't sleep well at night.  naps during the day). All other systems reviewed and are negative.           OBJECTIVE     /86   Pulse 64   Resp 12   Ht 5' 2\" (1.575 m)   Wt 162 lb (73.5 kg)   BMI 29.63 kg/m²     Wt Readings from Last 3 Colon cancer screen colonoscopy  02/22/2023    Lipid screen  08/03/2023    DTaP/Tdap/Td vaccine (2 - Td) 06/16/2024    DEXA (modify frequency per FRAX score)  Completed    Flu vaccine  Completed    Pneumococcal 65+ years Vaccine  Completed         ASSESSMENT      1. Annual physical exam    2. Gastroesophageal reflux disease with esophagitis    3. Essential hypertension    4. Encounter for screening mammogram for malignant neoplasm of breast    5. Other fatigue    6. Need for hepatitis C screening test    7. Need for influenza vaccination    8. Cough    9.  Excessive cerumen in ear canal, right        PLAN        Orders Placed This Encounter   Procedures    [facility] MELISSA DIGITAL SCREEN BILATERAL     Standing Status:   Future     Standing Expiration Date:   11/30/2020     Order Specific Question:   Reason for exam:     Answer:   screening    INFLUENZA, TRIV, INACTIVATED, SUBUNIT, ADJUVANTED, 65 YRS AND OLDER, IM, PREFILL SYR, 0.5ML (FLUAD TRIV)    CBC Auto Differential     Standing Status:   Future     Standing Expiration Date:   9/30/2020    Comprehensive Metabolic Panel     Standing Status:   Future     Standing Expiration Date:   9/30/2020    TSH without Reflex     Standing Status:   Future     Standing Expiration Date:   9/30/2020    T4, Free     Standing Status:   Future     Standing Expiration Date:   9/30/2020    Hepatitis C Antibody     Standing Status:   Future     Standing Expiration Date:   9/30/2020    Lipid Panel     Standing Status:   Future     Standing Expiration Date:   9/30/2020     Order Specific Question:   Is Patient Fasting?/# of Hours     Answer:   yes/12       Requested Prescriptions     Signed Prescriptions Disp Refills    omeprazole (PRILOSEC) 40 MG delayed release capsule 90 capsule 3     Sig: Take 1 capsule by mouth daily    hydrochlorothiazide (MICROZIDE) 12.5 MG capsule 90 capsule 3     Sig: TAKE 1 CAPSULE DAILY    benzonatate (TESSALON) 100 MG capsule 21 capsule 0     Sig: Take

## 2019-10-01 LAB
CHOLESTEROL, TOTAL: 194 MG/DL
CHOLESTEROL/HDL RATIO: NORMAL
HDLC SERPL-MCNC: 50 MG/DL (ref 35–70)
LDL CHOLESTEROL CALCULATED: 124 MG/DL (ref 0–160)
TRIGL SERPL-MCNC: 101 MG/DL
VLDLC SERPL CALC-MCNC: NORMAL MG/DL

## 2019-10-18 ENCOUNTER — TELEPHONE (OUTPATIENT)
Dept: FAMILY MEDICINE CLINIC | Age: 69
End: 2019-10-18

## 2019-10-21 ENCOUNTER — OFFICE VISIT (OUTPATIENT)
Dept: FAMILY MEDICINE CLINIC | Age: 69
End: 2019-10-21
Payer: MEDICARE

## 2019-10-21 VITALS
BODY MASS INDEX: 29.56 KG/M2 | DIASTOLIC BLOOD PRESSURE: 78 MMHG | RESPIRATION RATE: 12 BRPM | WEIGHT: 161.6 LBS | HEART RATE: 80 BPM | SYSTOLIC BLOOD PRESSURE: 120 MMHG

## 2019-10-21 DIAGNOSIS — R30.0 DYSURIA: Primary | ICD-10-CM

## 2019-10-21 PROCEDURE — 99213 OFFICE O/P EST LOW 20 MIN: CPT | Performed by: FAMILY MEDICINE

## 2019-10-21 RX ORDER — NITROFURANTOIN 25; 75 MG/1; MG/1
100 CAPSULE ORAL 2 TIMES DAILY
Qty: 14 CAPSULE | Refills: 0 | Status: SHIPPED | OUTPATIENT
Start: 2019-10-21 | End: 2019-10-28

## 2019-10-21 ASSESSMENT — ENCOUNTER SYMPTOMS
ABDOMINAL PAIN: 1
RESPIRATORY NEGATIVE: 1

## 2020-01-15 ENCOUNTER — NURSE ONLY (OUTPATIENT)
Dept: LAB | Age: 70
End: 2020-01-15

## 2020-01-15 LAB
ALBUMIN SERPL-MCNC: 4.3 G/DL (ref 3.5–5.1)
ALP BLD-CCNC: 80 U/L (ref 38–126)
ALT SERPL-CCNC: 12 U/L (ref 11–66)
ANION GAP SERPL CALCULATED.3IONS-SCNC: 14 MEQ/L (ref 8–16)
AST SERPL-CCNC: 20 U/L (ref 5–40)
BASOPHILS # BLD: 1 %
BASOPHILS ABSOLUTE: 0.1 THOU/MM3 (ref 0–0.1)
BILIRUB SERPL-MCNC: 0.3 MG/DL (ref 0.3–1.2)
BUN BLDV-MCNC: 21 MG/DL (ref 7–22)
CALCIUM SERPL-MCNC: 9.8 MG/DL (ref 8.5–10.5)
CHLORIDE BLD-SCNC: 102 MEQ/L (ref 98–111)
CO2: 25 MEQ/L (ref 23–33)
CREAT SERPL-MCNC: 0.6 MG/DL (ref 0.4–1.2)
EOSINOPHIL # BLD: 3.4 %
EOSINOPHILS ABSOLUTE: 0.2 THOU/MM3 (ref 0–0.4)
ERYTHROCYTE [DISTWIDTH] IN BLOOD BY AUTOMATED COUNT: 13 % (ref 11.5–14.5)
ERYTHROCYTE [DISTWIDTH] IN BLOOD BY AUTOMATED COUNT: 42.6 FL (ref 35–45)
GFR SERPL CREATININE-BSD FRML MDRD: > 90 ML/MIN/1.73M2
HCT VFR BLD CALC: 42 % (ref 37–47)
HEMOGLOBIN: 13.5 GM/DL (ref 12–16)
IMMATURE GRANS (ABS): 0.04 THOU/MM3 (ref 0–0.07)
IMMATURE GRANULOCYTES: 0.6 %
LYMPHOCYTES # BLD: 28.9 %
LYMPHOCYTES ABSOLUTE: 2 THOU/MM3 (ref 1–4.8)
MCH RBC QN AUTO: 28.6 PG (ref 26–33)
MCHC RBC AUTO-ENTMCNC: 32.1 GM/DL (ref 32.2–35.5)
MCV RBC AUTO: 89 FL (ref 81–99)
MONOCYTES # BLD: 12.6 %
MONOCYTES ABSOLUTE: 0.9 THOU/MM3 (ref 0.4–1.3)
NUCLEATED RED BLOOD CELLS: 0 /100 WBC
PLATELET # BLD: 254 THOU/MM3 (ref 130–400)
PMV BLD AUTO: 11 FL (ref 9.4–12.4)
POTASSIUM SERPL-SCNC: 4.6 MEQ/L (ref 3.5–5.2)
RBC # BLD: 4.72 MILL/MM3 (ref 4.2–5.4)
RHEUMATOID FACTOR: < 10 IU/ML (ref 0–13)
SEDIMENTATION RATE, ERYTHROCYTE: 42 MM/HR (ref 0–20)
SEG NEUTROPHILS: 53.5 %
SEGMENTED NEUTROPHILS ABSOLUTE COUNT: 3.7 THOU/MM3 (ref 1.8–7.7)
SODIUM BLD-SCNC: 141 MEQ/L (ref 135–145)
TOTAL PROTEIN: 7.9 G/DL (ref 6.1–8)
URIC ACID: 3.8 MG/DL (ref 2.4–5.7)
VITAMIN D 25-HYDROXY: 25 NG/ML (ref 30–100)
WBC # BLD: 7 THOU/MM3 (ref 4.8–10.8)

## 2020-01-17 LAB — CYCLIC CITRULLINATED PEPTIDE ANTIBODY IGG: < 1.5 U/ML

## 2020-01-18 LAB
ANA SCREEN: DETECTED
ANTI SSA: NORMAL
ANTI SSB: 4 AU/ML (ref 0–40)

## 2020-01-19 LAB
ANA PATTERN: ABNORMAL
ANA TITER: ABNORMAL
ANTINUCLEAR AB INTERPRETIVE COMMENT: ABNORMAL
ANTINUCLEAR ANTIBODY, HEP-2, IGG: DETECTED

## 2020-03-19 ENCOUNTER — PROCEDURE VISIT (OUTPATIENT)
Dept: NEUROLOGY | Age: 70
End: 2020-03-19
Payer: MEDICARE

## 2020-03-19 PROCEDURE — 95911 NRV CNDJ TEST 9-10 STUDIES: CPT | Performed by: PSYCHIATRY & NEUROLOGY

## 2020-03-19 PROCEDURE — 95886 MUSC TEST DONE W/N TEST COMP: CPT | Performed by: PSYCHIATRY & NEUROLOGY

## 2020-06-26 ENCOUNTER — OFFICE VISIT (OUTPATIENT)
Dept: FAMILY MEDICINE CLINIC | Age: 70
End: 2020-06-26
Payer: MEDICARE

## 2020-06-26 VITALS
TEMPERATURE: 97.6 F | BODY MASS INDEX: 29.33 KG/M2 | HEIGHT: 62 IN | WEIGHT: 159.4 LBS | SYSTOLIC BLOOD PRESSURE: 116 MMHG | DIASTOLIC BLOOD PRESSURE: 70 MMHG | RESPIRATION RATE: 16 BRPM | HEART RATE: 84 BPM

## 2020-06-26 PROCEDURE — 99213 OFFICE O/P EST LOW 20 MIN: CPT | Performed by: NURSE PRACTITIONER

## 2020-06-26 PROCEDURE — G8510 SCR DEP NEG, NO PLAN REQD: HCPCS | Performed by: NURSE PRACTITIONER

## 2020-06-26 RX ORDER — HYDROXYCHLOROQUINE SULFATE 200 MG/1
200 TABLET, FILM COATED ORAL 2 TIMES DAILY
COMMUNITY
Start: 2020-06-04

## 2020-06-26 SDOH — ECONOMIC STABILITY: TRANSPORTATION INSECURITY
IN THE PAST 12 MONTHS, HAS THE LACK OF TRANSPORTATION KEPT YOU FROM MEDICAL APPOINTMENTS OR FROM GETTING MEDICATIONS?: NO

## 2020-06-26 SDOH — ECONOMIC STABILITY: FOOD INSECURITY: WITHIN THE PAST 12 MONTHS, YOU WORRIED THAT YOUR FOOD WOULD RUN OUT BEFORE YOU GOT MONEY TO BUY MORE.: NEVER TRUE

## 2020-06-26 SDOH — ECONOMIC STABILITY: INCOME INSECURITY: HOW HARD IS IT FOR YOU TO PAY FOR THE VERY BASICS LIKE FOOD, HOUSING, MEDICAL CARE, AND HEATING?: NOT HARD AT ALL

## 2020-06-26 SDOH — ECONOMIC STABILITY: FOOD INSECURITY: WITHIN THE PAST 12 MONTHS, THE FOOD YOU BOUGHT JUST DIDN'T LAST AND YOU DIDN'T HAVE MONEY TO GET MORE.: NEVER TRUE

## 2020-06-26 SDOH — ECONOMIC STABILITY: TRANSPORTATION INSECURITY
IN THE PAST 12 MONTHS, HAS LACK OF TRANSPORTATION KEPT YOU FROM MEETINGS, WORK, OR FROM GETTING THINGS NEEDED FOR DAILY LIVING?: NO

## 2020-06-26 ASSESSMENT — PATIENT HEALTH QUESTIONNAIRE - PHQ9
SUM OF ALL RESPONSES TO PHQ QUESTIONS 1-9: 0
SUM OF ALL RESPONSES TO PHQ QUESTIONS 1-9: 0
SUM OF ALL RESPONSES TO PHQ9 QUESTIONS 1 & 2: 0
1. LITTLE INTEREST OR PLEASURE IN DOING THINGS: 0
2. FEELING DOWN, DEPRESSED OR HOPELESS: 0

## 2020-06-26 ASSESSMENT — ENCOUNTER SYMPTOMS
CONSTIPATION: 0
BLOOD IN STOOL: 0
VOMITING: 0
SHORTNESS OF BREATH: 0
DIARRHEA: 0
NAUSEA: 0

## 2020-06-26 NOTE — PROGRESS NOTES
([facility])  [facility]         OBJECTIVE     /70   Pulse 84   Temp 97.6 °F (36.4 °C) (Oral)   Resp 16   Ht 5' 2\" (1.575 m)   Wt 159 lb 6.4 oz (72.3 kg)   BMI 29.15 kg/m²     Wt Readings from Last 3 Encounters:   06/26/20 159 lb 6.4 oz (72.3 kg)   10/21/19 161 lb 9.6 oz (73.3 kg)   09/30/19 162 lb (73.5 kg)       Physical Exam  Vitals signs and nursing note reviewed. Constitutional:       Appearance: She is well-developed. HENT:      Head: Normocephalic and atraumatic. Right Ear: Tympanic membrane and external ear normal.      Left Ear: External ear normal. There is impacted cerumen. Nose: Nose normal.   Eyes:      Conjunctiva/sclera: Conjunctivae normal.      Pupils: Pupils are equal, round, and reactive to light. Neck:      Musculoskeletal: Normal range of motion and neck supple. Cardiovascular:      Rate and Rhythm: Normal rate and regular rhythm. Heart sounds: Normal heart sounds. Pulmonary:      Effort: Pulmonary effort is normal.      Breath sounds: Normal breath sounds. Abdominal:      General: Bowel sounds are normal.      Palpations: Abdomen is soft. Musculoskeletal: Normal range of motion. Skin:     General: Skin is warm and dry. Neurological:      Mental Status: She is alert and oriented to person, place, and time. Deep Tendon Reflexes: Reflexes are normal and symmetric. Psychiatric:         Behavior: Behavior normal.         Thought Content:  Thought content normal.         Judgment: Judgment normal.           Immunization History   Administered Date(s) Administered    Influenza Virus Vaccine 10/30/2013, 10/16/2015    Influenza, High Dose (Fluzone 65 yrs and older) 11/10/2018    Influenza, Quadv, IM, (6 mo and older Fluzone, Flulaval, Fluarix and 3 yrs and older Afluria) 10/17/2016    Influenza, Triv, inactivated, subunit, adjuvanted, IM (Fluad 65 yrs and older) 09/30/2019    Pneumococcal Conjugate 13-valent (Bennett Carranza) 10/16/2015    Pneumococcal Polysaccharide ([id number]) 10/30/2013, 11/10/2018    Tdap (Boostrix, Adacel) 06/16/2014    Zoster Live (Zostavax) 10/30/2013         ASSESSMENT       Diagnosis Orders   1. Preop examination  XR CHEST STANDARD (2 VW)   2. Meniere's disease, unspecified laterality     3. Essential hypertension     4. Neuropathy of upper extremity, left         PLAN     OK FOR OR FROM MY STANDPOINT- NO ACTIVE CONTRAINDICATIONS AT THIS TIME  [facility]IA-OPERATIVE MEDICATION INSTRUCTIONS ALREADY PROVIDED TO PT BY SURGEON'S OFFICE. Left ear flushed  Continue current medications otherwise.    Follow up as needed           Electronically signed by QUINTON Hampton CNP on 6/26/2020 at 4:16 PM

## 2020-08-04 ENCOUNTER — NURSE ONLY (OUTPATIENT)
Dept: LAB | Age: 70
End: 2020-08-04

## 2020-11-12 RX ORDER — HYDROCHLOROTHIAZIDE 12.5 MG/1
CAPSULE, GELATIN COATED ORAL
Qty: 90 CAPSULE | Refills: 3 | Status: SHIPPED | OUTPATIENT
Start: 2020-11-12 | End: 2021-07-09 | Stop reason: SDUPTHER

## 2020-11-12 NOTE — TELEPHONE ENCOUNTER
Ras Medina called requesting a refill on the following medications:  Requested Prescriptions     Pending Prescriptions Disp Refills    hydroCHLOROthiazide (MICROZIDE) 12.5 MG capsule 90 capsule 3     Sig: TAKE 1 CAPSULE DAILY     Pharmacy verified:  [email or web address]      Date of last visit: 6/26/20  Date of next visit (if applicable): 79/21/1033

## 2020-11-12 NOTE — TELEPHONE ENCOUNTER
Rx EP'd to pharmacy. Please notify patient.       Requested Prescriptions     Signed Prescriptions Disp Refills    hydroCHLOROthiazide (MICROZIDE) 12.5 MG capsule 90 capsule 3     Sig: TAKE 1 CAPSULE DAILY     Authorizing Provider: Rajinder Allen           Electronically signed by Charlie Hoffman MD on 11/12/2020 at 2:42 PM

## 2020-11-30 ENCOUNTER — OFFICE VISIT (OUTPATIENT)
Dept: FAMILY MEDICINE CLINIC | Age: 70
End: 2020-11-30
Payer: MEDICARE

## 2020-11-30 VITALS
DIASTOLIC BLOOD PRESSURE: 70 MMHG | WEIGHT: 157 LBS | BODY MASS INDEX: 28.72 KG/M2 | SYSTOLIC BLOOD PRESSURE: 136 MMHG | TEMPERATURE: 96.6 F | HEART RATE: 80 BPM | RESPIRATION RATE: 16 BRPM

## 2020-11-30 PROBLEM — F33.0 MAJOR DEPRESSIVE DISORDER, RECURRENT, MILD (HCC): Status: RESOLVED | Noted: 2020-11-30 | Resolved: 2020-11-30

## 2020-11-30 PROBLEM — F33.0 MAJOR DEPRESSIVE DISORDER, RECURRENT, MILD (HCC): Status: ACTIVE | Noted: 2020-11-30

## 2020-11-30 PROCEDURE — 99214 OFFICE O/P EST MOD 30 MIN: CPT | Performed by: FAMILY MEDICINE

## 2020-11-30 RX ORDER — TRIAMCINOLONE ACETONIDE 5 MG/G
CREAM TOPICAL
COMMUNITY
Start: 2020-10-20

## 2020-11-30 RX ORDER — BUDESONIDE 3 MG/1
3 CAPSULE, COATED PELLETS ORAL DAILY PRN
COMMUNITY
Start: 2020-10-08

## 2020-11-30 RX ORDER — OMEPRAZOLE 40 MG/1
40 CAPSULE, DELAYED RELEASE ORAL DAILY
Qty: 90 CAPSULE | Refills: 3 | Status: SHIPPED | OUTPATIENT
Start: 2020-11-30 | End: 2021-07-09 | Stop reason: SDUPTHER

## 2020-11-30 ASSESSMENT — ENCOUNTER SYMPTOMS
BLOOD IN STOOL: 0
ABDOMINAL PAIN: 1
EYES NEGATIVE: 1
DIARRHEA: 1
VOMITING: 0
SHORTNESS OF BREATH: 0
NAUSEA: 0

## 2020-11-30 ASSESSMENT — PATIENT HEALTH QUESTIONNAIRE - PHQ9
SUM OF ALL RESPONSES TO PHQ QUESTIONS 1-9: 0
2. FEELING DOWN, DEPRESSED OR HOPELESS: 0
SUM OF ALL RESPONSES TO PHQ QUESTIONS 1-9: 0
1. LITTLE INTEREST OR PLEASURE IN DOING THINGS: 0
SUM OF ALL RESPONSES TO PHQ QUESTIONS 1-9: 0
SUM OF ALL RESPONSES TO PHQ9 QUESTIONS 1 & 2: 0

## 2020-11-30 NOTE — PROGRESS NOTES
Readings from Last 3 Encounters:   11/30/20 157 lb (71.2 kg)   06/26/20 159 lb 6.4 oz (72.3 kg)   10/21/19 161 lb 9.6 oz (73.3 kg)       Physical Exam  Vitals signs and nursing note reviewed. Constitutional:       General: She is not in acute distress. Appearance: She is well-developed. HENT:      Head: Normocephalic and atraumatic. Right Ear: Tympanic membrane normal.      Left Ear: Tympanic membrane normal.      Mouth/Throat:      Mouth: Mucous membranes are moist.      Pharynx: No posterior oropharyngeal erythema. Eyes:      Conjunctiva/sclera: Conjunctivae normal.   Neck:      Musculoskeletal: Neck supple. Thyroid: No thyromegaly. Cardiovascular:      Rate and Rhythm: Normal rate and regular rhythm. Heart sounds: No murmur. Pulmonary:      Effort: Pulmonary effort is normal.      Breath sounds: Normal breath sounds. No wheezing. Abdominal:      General: Bowel sounds are normal.      Palpations: Abdomen is soft. Tenderness: There is no abdominal tenderness. There is no guarding or rebound. Musculoskeletal:      Right lower leg: No edema. Left lower leg: No edema. Lymphadenopathy:      Cervical: No cervical adenopathy. Skin:     General: Skin is warm and dry. Findings: No rash. Neurological:      Mental Status: She is alert and oriented to person, place, and time.    Psychiatric:         Behavior: Behavior normal.                     Immunization History   Administered Date(s) Administered    Influenza Virus Vaccine 10/30/2013, 10/16/2015    Influenza, High Dose (Fluzone 65 yrs and older) 11/10/2018, 10/30/2020    Influenza, Quadv, IM, (6 mo and older Fluzone, Flulaval, Fluarix and 3 yrs and older Afluria) 10/17/2016    Influenza, Triv, inactivated, subunit, adjuvanted, IM (Fluad 65 yrs and older) 09/30/2019    Pneumococcal Conjugate 13-valent ([id number]) 10/16/2015    Pneumococcal Polysaccharide ([id number]) 10/30/2013, 11/10/2018    Tdap (Boostrix, on the following healthy behaviors: exercise and medication adherence    I have instructed Malik Richey to complete a self tracking handout on Blood Pressures  and instructed them to bring it with them to her next appointment. Discussed use, benefit, and side effects of prescribed medications. Barriers to medication compliance addressed. All patient questions answered. Pt voiced understanding.            Follow up yearly and prn          Electronically signed by Ean Henning MD on 11/30/2020 at 8:52 AM

## 2020-12-10 LAB
CHOLESTEROL, TOTAL: 169 MG/DL
CHOLESTEROL/HDL RATIO: NORMAL
HDLC SERPL-MCNC: 64 MG/DL (ref 35–70)
LDL CHOLESTEROL CALCULATED: 86 MG/DL (ref 0–160)
NONHDLC SERPL-MCNC: NORMAL MG/DL
TRIGL SERPL-MCNC: 96 MG/DL
VLDLC SERPL CALC-MCNC: NORMAL MG/DL

## 2021-01-06 ENCOUNTER — TELEPHONE (OUTPATIENT)
Dept: FAMILY MEDICINE CLINIC | Age: 71
End: 2021-01-06

## 2021-01-06 NOTE — TELEPHONE ENCOUNTER
I would recommend the COVID vaccine when available as long as she has no h/o previous serious reactions to vaccines.  CG

## 2021-01-06 NOTE — TELEPHONE ENCOUNTER
LM on voicemail asking if she should get the COVID vaccine when it becomes available to her. Please advise. Call pt back at [phone].

## 2021-07-09 DIAGNOSIS — I10 ESSENTIAL HYPERTENSION: ICD-10-CM

## 2021-07-09 DIAGNOSIS — K21.00 GASTROESOPHAGEAL REFLUX DISEASE WITH ESOPHAGITIS WITHOUT HEMORRHAGE: ICD-10-CM

## 2021-07-09 RX ORDER — OMEPRAZOLE 40 MG/1
40 CAPSULE, DELAYED RELEASE ORAL DAILY
Qty: 90 CAPSULE | Refills: 1 | Status: SHIPPED | OUTPATIENT
Start: 2021-07-09

## 2021-07-09 RX ORDER — HYDROCHLOROTHIAZIDE 12.5 MG/1
CAPSULE, GELATIN COATED ORAL
Qty: 90 CAPSULE | Refills: 1 | Status: SHIPPED | OUTPATIENT
Start: 2021-07-09

## 2021-07-09 NOTE — TELEPHONE ENCOUNTER
----- Message from Micahel Jasmine sent at 7/8/2021 10:56 AM EDT -----  Subject: Refill Request    QUESTIONS  Name of Medication? omeprazole (PRILOSEC) 40 MG delayed release capsule  Patient-reported dosage and instructions? take 1 once a day  How many days do you have left? 1  Preferred Pharmacy? [facility] phone number (if available)? [phone]  ---------------------------------------------------------------------------  --------------,  Name of Medication? hydroCHLOROthiazide (MICROZIDE) 12.5 MG capsule  Patient-reported dosage and instructions? take 1 once a day   How many days do you have left? 1  Preferred Pharmacy? [facility] phone number (if available)? [phone]  ---------------------------------------------------------------------------  --------------  CALL BACK INFO  What is the best way for the office to contact you? OK to leave message on   voicemail  Preferred Call Back Phone Number?  [phone]

## 2022-05-10 ENCOUNTER — OFFICE VISIT (OUTPATIENT)
Dept: CARDIOLOGY CLINIC | Age: 72
End: 2022-05-10
Payer: MEDICARE

## 2022-05-10 VITALS
SYSTOLIC BLOOD PRESSURE: 146 MMHG | HEART RATE: 91 BPM | DIASTOLIC BLOOD PRESSURE: 77 MMHG | BODY MASS INDEX: 29.64 KG/M2 | WEIGHT: 157 LBS | HEIGHT: 61 IN

## 2022-05-10 DIAGNOSIS — R07.2 PRECORDIAL PAIN: ICD-10-CM

## 2022-05-10 DIAGNOSIS — I10 PRIMARY HYPERTENSION: ICD-10-CM

## 2022-05-10 DIAGNOSIS — R93.1 ABNORMAL ECHOCARDIOGRAM: Primary | ICD-10-CM

## 2022-05-10 PROCEDURE — 99204 OFFICE O/P NEW MOD 45 MIN: CPT | Performed by: NUCLEAR MEDICINE

## 2022-05-10 PROCEDURE — 93000 ELECTROCARDIOGRAM COMPLETE: CPT | Performed by: NUCLEAR MEDICINE

## 2022-05-10 RX ORDER — TRIAMCINOLONE ACETONIDE 1 MG/G
CREAM TOPICAL
COMMUNITY
Start: 2022-02-22

## 2022-05-10 RX ORDER — LISINOPRIL 10 MG/1
TABLET ORAL
COMMUNITY
Start: 2022-05-06

## 2022-05-10 RX ORDER — MELOXICAM 15 MG/1
15 TABLET ORAL DAILY
COMMUNITY
Start: 2022-04-11

## 2022-05-10 ASSESSMENT — ENCOUNTER SYMPTOMS
VOMITING: 0
COLOR CHANGE: 0
NAUSEA: 0
CHEST TIGHTNESS: 1
DIARRHEA: 0
BLOOD IN STOOL: 0
ANAL BLEEDING: 0
ABDOMINAL PAIN: 0
CONSTIPATION: 0
BACK PAIN: 0
ABDOMINAL DISTENTION: 0
PHOTOPHOBIA: 0
RECTAL PAIN: 0
SHORTNESS OF BREATH: 0

## 2022-05-10 NOTE — PROGRESS NOTES
[facility]  [address] [address]  Dept: [phone]  Dept Fax: [phone]  Loc: [phone]    Visit Date: 5/10/2022    Nell Ballesteros is a 70 y.o. female who presents todayfor:  Chief Complaint   Patient presents with   [address] Patient    Establish Cardiologist    Hypertension    Chest Pain   here for the first time  Had chest pain   Sharp   Intermittent in nature  Resting usually   Lasts several seconds   Not lasting long   No radiation   Left sided  No associated symptoms  No known CAD  Known HTN   On medical Rx  Seems under control at home  Echo done   Mild MR  Mild LVH  Usually she is active   Walks every day   No smoking   Family history of CAD       HPI:  HPI  Past Medical History:   Diagnosis Date    [facility] [address]     Hypertension     Lupus ([facility] 11/2020    Dr. Jimmie Benz    Meniere disease     OA (osteoarthritis)     Osteopenia       Past Surgical History:   Procedure Laterality Date    ARM SURGERY Right 05/2014    right cubital tunnel release    CHOLECYSTECTOMY      STOMACH SURGERY      Resection of malignant tumor    RADHA AND BSO       Family History   Problem Relation Age of Onset    Heart Surgery Mother     Cancer Brother         kidney    Heart Surgery Brother      Social History     Tobacco Use    Smoking status: Never Smoker    Smokeless tobacco: Never Used   Substance Use Topics    Alcohol use: No      Current Outpatient Medications   Medication Sig Dispense Refill    meloxicam (MOBIC) 15 MG tablet Take 15 mg by mouth daily       triamcinolone (KENALOG) 0.1 % cream       omeprazole (PRILOSEC) 40 MG delayed release capsule Take 1 capsule by mouth daily 90 capsule 1    hydroCHLOROthiazide (MICROZIDE) 12.5 MG capsule TAKE 1 CAPSULE DAILY 90 capsule 1    budesonide (ENTOCORT EC) 3 MG extended release capsule Take 3 mg by mouth daily as needed (colitis)       hydroxychloroquine (PLAQUENIL) 200 MG tablet Take 200 mg by mouth 2 times daily       Calcium Citrate-Vitamin D (CITRACAL + D PO) Take 1,200 mg by mouth      fluorouracil (EFUDEX) 5 % cream Apply topically daily as needed       balsalazide (COLAZAL) 750 MG capsule Take by mouth daily as needed (colitis)       lisinopril (PRINIVIL;ZESTRIL) 10 MG tablet  (Patient not taking: Reported on 5/10/2022)      triamcinolone (ARISTOCORT) 0.5 % cream PRN       No current facility-administered medications for this visit. Allergies   Allergen Reactions    Demerol Nausea And Vomiting    Meperidine Nausea Only and Other (See Comments)     Health Maintenance   Topic Date Due    Annual Wellness Visit (AWV)  Never done    COVID-19 Vaccine (1) Never done    Shingles vaccine (2 of 3) 12/25/2013    Potassium  12/11/2016    Creatinine  12/11/2016    Depression Screen  11/30/2021    Breast cancer screen  12/10/2022    Colorectal Cancer Screen  02/22/2023    DTaP/Tdap/Td vaccine (2 - Td or Tdap) 06/16/2024    Lipids  12/10/2025    DEXA (modify frequency per FRAX score)  Completed    Flu vaccine  Completed    Pneumococcal 65+ years Vaccine  Completed    Hepatitis C screen  Completed    Hepatitis A vaccine  Aged Out    Hepatitis B vaccine  Aged Out    Hib vaccine  Aged Out    Meningococcal (ACWY) vaccine  Aged Out       Subjective:  Review of Systems   Constitutional: Positive for fatigue. HENT: Negative for ear pain and postnasal drip. Eyes: Negative for photophobia. Respiratory: Positive for chest tightness. Negative for shortness of breath. Cardiovascular: Positive for chest pain. Negative for palpitations. Gastrointestinal: Negative for abdominal distention, abdominal pain, anal bleeding, blood in stool, constipation, diarrhea, nausea, rectal pain and vomiting. Endocrine: Negative for polyphagia. Genitourinary: Negative for dysuria, frequency and urgency.    Musculoskeletal: Negative for arthralgias, back pain, gait problem, joint swelling, myalgias, neck pain and neck stiffness. Skin: Negative for color change, pallor, rash and wound. Allergic/Immunologic: Negative for food allergies. Neurological: Negative for dizziness, syncope and light-headedness. Psychiatric/Behavioral: Negative for confusion, decreased concentration, dysphoric mood, hallucinations and self-injury. The patient is not nervous/anxious and is not hyperactive. Objective:  Physical Exam  HENT:      Head: Normocephalic. Right Ear: Tympanic membrane normal.      Nose: Nose normal.      Mouth/Throat:      Mouth: Mucous membranes are moist.   Eyes:      Pupils: Pupils are equal, round, and reactive to light. Cardiovascular:      Rate and Rhythm: Normal rate and regular rhythm. Heart sounds: Murmur heard. No gallop. Pulmonary:      Effort: No respiratory distress. Breath sounds: No stridor. No wheezing, rhonchi or rales. Chest:      Chest wall: No tenderness. Abdominal:      General: There is no distension. Palpations: There is no mass. Tenderness: There is no abdominal tenderness. There is no right CVA tenderness, left CVA tenderness, guarding or rebound. Hernia: No hernia is present. Musculoskeletal:         General: No swelling, tenderness, deformity or signs of injury. Cervical back: Normal range of motion. Right lower leg: No edema. Left lower leg: No edema. Skin:     Coloration: Skin is not jaundiced or pale. Findings: No bruising, erythema, lesion or rash. Neurological:      Mental Status: She is alert and oriented to person, place, and time. Cranial Nerves: No cranial nerve deficit. Sensory: No sensory deficit. Motor: No weakness. Coordination: Coordination normal.      Gait: Gait normal.      Deep Tendon Reflexes: Reflexes normal.   Psychiatric:         Mood and Affect: Mood normal.         Thought Content:  Thought content normal.       BP (!) 146/77   Pulse 91   Ht 5' 1\" (1.549 m)   Wt 157 lb (71.2 kg)   BMI 29.66 kg/m²     Assessment:      Diagnosis Orders   1. Abnormal echocardiogram  EKG 12 Lead   2. Primary hypertension     3. Precordial pain     atypical chest pain   Higher risk for CAD  Not exertional   Borderline valve issues  And LVH   ECG in office was done today. I reviewed the ECG. No acute findings      Plan:  No follow-ups on file. As above  Discussed  Consider a stress test   Continue risk factor modification and medical management  Thank you for allowing me to participate in the care of your patient. Please don't hesitate to contact me regarding any further issues related to the patient care    Orders Placed:  Orders Placed This Encounter   Procedures    EKG 12 Lead     Order Specific Question:   Reason for Exam?     Answer: Other       Medications Prescribed:  No orders of the defined types were placed in this encounter. Discussed use, benefit, and side effects of prescribed medications. All patient questions answered. Pt voicedunderstanding. Instructed to continue current medications, diet and exercise. Continue risk factor modification and medical management. Patient agreed with treatment plan. Follow up as directed.     Electronically signedby Merced Rebolledo MD on 5/10/2022 at 8:33 AM

## 2024-12-03 RX ORDER — VALACYCLOVIR HYDROCHLORIDE 1 G/1
TABLET, FILM COATED ORAL
COMMUNITY
Start: 2023-12-15

## 2024-12-03 RX ORDER — BUDESONIDE 3 MG/1
9 CAPSULE, COATED PELLETS ORAL EVERY MORNING
COMMUNITY
End: 2024-12-03

## 2024-12-11 ENCOUNTER — HOSPITAL ENCOUNTER (INPATIENT)
Age: 74
LOS: 7 days | Discharge: NURSING, REHAB OR LONG-TERM CARE | DRG: 516 | End: 2024-12-18
Attending: ORTHOPAEDIC SURGERY | Admitting: ORTHOPAEDIC SURGERY
Payer: MEDICARE

## 2024-12-11 ENCOUNTER — ANESTHESIA EVENT (OUTPATIENT)
Dept: OPERATING ROOM | Age: 74
End: 2024-12-11
Payer: MEDICARE

## 2024-12-11 ENCOUNTER — APPOINTMENT (OUTPATIENT)
Dept: GENERAL RADIOLOGY | Age: 74
DRG: 516 | End: 2024-12-11
Attending: ORTHOPAEDIC SURGERY
Payer: MEDICARE

## 2024-12-11 ENCOUNTER — ANESTHESIA (OUTPATIENT)
Dept: OPERATING ROOM | Age: 74
End: 2024-12-11
Payer: MEDICARE

## 2024-12-11 DIAGNOSIS — M48.061 SPINAL STENOSIS OF LUMBAR REGION WITH RADICULOPATHY: Primary | ICD-10-CM

## 2024-12-11 DIAGNOSIS — M54.16 SPINAL STENOSIS OF LUMBAR REGION WITH RADICULOPATHY: Primary | ICD-10-CM

## 2024-12-11 LAB
ABO: NORMAL
ANTIBODY SCREEN: NORMAL
REASON FOR REJECTION: NORMAL
REJECTED TEST: NORMAL
RH FACTOR: NORMAL

## 2024-12-11 PROCEDURE — 6360000002 HC RX W HCPCS: Performed by: ORTHOPAEDIC SURGERY

## 2024-12-11 PROCEDURE — 3600000014 HC SURGERY LEVEL 4 ADDTL 15MIN: Performed by: ORTHOPAEDIC SURGERY

## 2024-12-11 PROCEDURE — 6360000002 HC RX W HCPCS: Performed by: NURSE ANESTHETIST, CERTIFIED REGISTERED

## 2024-12-11 PROCEDURE — 86901 BLOOD TYPING SEROLOGIC RH(D): CPT

## 2024-12-11 PROCEDURE — 2709999900 HC NON-CHARGEABLE SUPPLY: Performed by: ORTHOPAEDIC SURGERY

## 2024-12-11 PROCEDURE — 2580000003 HC RX 258: Performed by: PHYSICIAN ASSISTANT

## 2024-12-11 PROCEDURE — 3600000004 HC SURGERY LEVEL 4 BASE: Performed by: ORTHOPAEDIC SURGERY

## 2024-12-11 PROCEDURE — 2500000003 HC RX 250 WO HCPCS: Performed by: NURSE ANESTHETIST, CERTIFIED REGISTERED

## 2024-12-11 PROCEDURE — 2720000010 HC SURG SUPPLY STERILE: Performed by: ORTHOPAEDIC SURGERY

## 2024-12-11 PROCEDURE — 72020 X-RAY EXAM OF SPINE 1 VIEW: CPT

## 2024-12-11 PROCEDURE — 86850 RBC ANTIBODY SCREEN: CPT

## 2024-12-11 PROCEDURE — 86900 BLOOD TYPING SEROLOGIC ABO: CPT

## 2024-12-11 PROCEDURE — 3700000001 HC ADD 15 MINUTES (ANESTHESIA): Performed by: ORTHOPAEDIC SURGERY

## 2024-12-11 PROCEDURE — 6360000002 HC RX W HCPCS: Performed by: STUDENT IN AN ORGANIZED HEALTH CARE EDUCATION/TRAINING PROGRAM

## 2024-12-11 PROCEDURE — 1200000000 HC SEMI PRIVATE

## 2024-12-11 PROCEDURE — 7100000001 HC PACU RECOVERY - ADDTL 15 MIN: Performed by: ORTHOPAEDIC SURGERY

## 2024-12-11 PROCEDURE — 6360000002 HC RX W HCPCS: Performed by: PHYSICIAN ASSISTANT

## 2024-12-11 PROCEDURE — 36415 COLL VENOUS BLD VENIPUNCTURE: CPT

## 2024-12-11 PROCEDURE — 7100000000 HC PACU RECOVERY - FIRST 15 MIN: Performed by: ORTHOPAEDIC SURGERY

## 2024-12-11 PROCEDURE — 3700000000 HC ANESTHESIA ATTENDED CARE: Performed by: ORTHOPAEDIC SURGERY

## 2024-12-11 PROCEDURE — 6360000002 HC RX W HCPCS: Performed by: ANESTHESIOLOGY

## 2024-12-11 PROCEDURE — 03HY32Z INSERTION OF MONITORING DEVICE INTO UPPER ARTERY, PERCUTANEOUS APPROACH: ICD-10-PCS | Performed by: ORTHOPAEDIC SURGERY

## 2024-12-11 RX ORDER — EPHEDRINE SULFATE/0.9% NACL/PF 25 MG/5 ML
SYRINGE (ML) INTRAVENOUS
Status: DISCONTINUED | OUTPATIENT
Start: 2024-12-11 | End: 2024-12-11 | Stop reason: SDUPTHER

## 2024-12-11 RX ORDER — CYCLOBENZAPRINE HCL 10 MG
10 TABLET ORAL 3 TIMES DAILY PRN
Status: DISCONTINUED | OUTPATIENT
Start: 2024-12-11 | End: 2024-12-18 | Stop reason: HOSPADM

## 2024-12-11 RX ORDER — FENTANYL CITRATE 50 UG/ML
50 INJECTION, SOLUTION INTRAMUSCULAR; INTRAVENOUS EVERY 5 MIN PRN
Status: DISCONTINUED | OUTPATIENT
Start: 2024-12-11 | End: 2024-12-11 | Stop reason: HOSPADM

## 2024-12-11 RX ORDER — ONDANSETRON 4 MG/1
4 TABLET, ORALLY DISINTEGRATING ORAL EVERY 8 HOURS PRN
Status: DISCONTINUED | OUTPATIENT
Start: 2024-12-11 | End: 2024-12-18 | Stop reason: HOSPADM

## 2024-12-11 RX ORDER — HYDROMORPHONE HYDROCHLORIDE 2 MG/ML
INJECTION, SOLUTION INTRAMUSCULAR; INTRAVENOUS; SUBCUTANEOUS
Status: DISCONTINUED | OUTPATIENT
Start: 2024-12-11 | End: 2024-12-11 | Stop reason: SDUPTHER

## 2024-12-11 RX ORDER — ONDANSETRON 2 MG/ML
INJECTION INTRAMUSCULAR; INTRAVENOUS
Status: DISCONTINUED | OUTPATIENT
Start: 2024-12-11 | End: 2024-12-11 | Stop reason: SDUPTHER

## 2024-12-11 RX ORDER — SENNA AND DOCUSATE SODIUM 50; 8.6 MG/1; MG/1
1 TABLET, FILM COATED ORAL 2 TIMES DAILY
Status: DISCONTINUED | OUTPATIENT
Start: 2024-12-11 | End: 2024-12-18 | Stop reason: HOSPADM

## 2024-12-11 RX ORDER — POLYETHYLENE GLYCOL 3350 17 G/17G
17 POWDER, FOR SOLUTION ORAL DAILY
Status: DISCONTINUED | OUTPATIENT
Start: 2024-12-12 | End: 2024-12-18 | Stop reason: HOSPADM

## 2024-12-11 RX ORDER — BISACODYL 10 MG
10 SUPPOSITORY, RECTAL RECTAL DAILY PRN
Status: DISCONTINUED | OUTPATIENT
Start: 2024-12-11 | End: 2024-12-18 | Stop reason: HOSPADM

## 2024-12-11 RX ORDER — DIPHENHYDRAMINE HYDROCHLORIDE 50 MG/ML
12.5 INJECTION INTRAMUSCULAR; INTRAVENOUS
Status: DISCONTINUED | OUTPATIENT
Start: 2024-12-11 | End: 2024-12-11 | Stop reason: HOSPADM

## 2024-12-11 RX ORDER — BUDESONIDE 3 MG/1
3 CAPSULE, COATED PELLETS ORAL DAILY PRN
Status: DISCONTINUED | OUTPATIENT
Start: 2024-12-11 | End: 2024-12-18 | Stop reason: HOSPADM

## 2024-12-11 RX ORDER — SODIUM CHLORIDE 0.9 % (FLUSH) 0.9 %
5-40 SYRINGE (ML) INJECTION EVERY 12 HOURS SCHEDULED
Status: DISCONTINUED | OUTPATIENT
Start: 2024-12-11 | End: 2024-12-18 | Stop reason: HOSPADM

## 2024-12-11 RX ORDER — OXYCODONE AND ACETAMINOPHEN 5; 325 MG/1; MG/1
1 TABLET ORAL EVERY 4 HOURS PRN
Status: DISCONTINUED | OUTPATIENT
Start: 2024-12-11 | End: 2024-12-18 | Stop reason: HOSPADM

## 2024-12-11 RX ORDER — BISACODYL 5 MG/1
5 TABLET, DELAYED RELEASE ORAL DAILY
Status: DISCONTINUED | OUTPATIENT
Start: 2024-12-12 | End: 2024-12-18 | Stop reason: HOSPADM

## 2024-12-11 RX ORDER — LIDOCAINE HYDROCHLORIDE AND EPINEPHRINE 10; 10 MG/ML; UG/ML
INJECTION, SOLUTION INFILTRATION; PERINEURAL PRN
Status: DISCONTINUED | OUTPATIENT
Start: 2024-12-11 | End: 2024-12-11 | Stop reason: ALTCHOICE

## 2024-12-11 RX ORDER — SODIUM CHLORIDE 9 MG/ML
INJECTION, SOLUTION INTRAVENOUS CONTINUOUS
Status: DISCONTINUED | OUTPATIENT
Start: 2024-12-11 | End: 2024-12-11 | Stop reason: HOSPADM

## 2024-12-11 RX ORDER — OXYCODONE AND ACETAMINOPHEN 5; 325 MG/1; MG/1
2 TABLET ORAL EVERY 4 HOURS PRN
Status: DISCONTINUED | OUTPATIENT
Start: 2024-12-11 | End: 2024-12-18 | Stop reason: HOSPADM

## 2024-12-11 RX ORDER — HYDROXYCHLOROQUINE SULFATE 200 MG/1
200 TABLET, FILM COATED ORAL 2 TIMES DAILY
Status: DISCONTINUED | OUTPATIENT
Start: 2024-12-11 | End: 2024-12-18 | Stop reason: HOSPADM

## 2024-12-11 RX ORDER — SODIUM CHLORIDE 9 MG/ML
INJECTION, SOLUTION INTRAVENOUS CONTINUOUS
Status: DISCONTINUED | OUTPATIENT
Start: 2024-12-11 | End: 2024-12-18 | Stop reason: HOSPADM

## 2024-12-11 RX ORDER — DEXAMETHASONE SODIUM PHOSPHATE 10 MG/ML
INJECTION, EMULSION INTRAMUSCULAR; INTRAVENOUS
Status: DISCONTINUED | OUTPATIENT
Start: 2024-12-11 | End: 2024-12-11 | Stop reason: SDUPTHER

## 2024-12-11 RX ORDER — PANTOPRAZOLE SODIUM 40 MG/1
40 TABLET, DELAYED RELEASE ORAL
Status: DISCONTINUED | OUTPATIENT
Start: 2024-12-12 | End: 2024-12-18 | Stop reason: HOSPADM

## 2024-12-11 RX ORDER — 0.9 % SODIUM CHLORIDE 0.9 %
1000 INTRAVENOUS SOLUTION INTRAVENOUS ONCE
Status: COMPLETED | OUTPATIENT
Start: 2024-12-11 | End: 2024-12-11

## 2024-12-11 RX ORDER — SODIUM CHLORIDE 9 MG/ML
INJECTION, SOLUTION INTRAVENOUS PRN
Status: DISCONTINUED | OUTPATIENT
Start: 2024-12-11 | End: 2024-12-11 | Stop reason: HOSPADM

## 2024-12-11 RX ORDER — SODIUM CHLORIDE 0.9 % (FLUSH) 0.9 %
5-40 SYRINGE (ML) INJECTION EVERY 12 HOURS SCHEDULED
Status: DISCONTINUED | OUTPATIENT
Start: 2024-12-11 | End: 2024-12-11 | Stop reason: HOSPADM

## 2024-12-11 RX ORDER — HYDROCHLOROTHIAZIDE 12.5 MG/1
12.5 CAPSULE ORAL DAILY
Status: DISCONTINUED | OUTPATIENT
Start: 2024-12-12 | End: 2024-12-18 | Stop reason: HOSPADM

## 2024-12-11 RX ORDER — PHENYLEPHRINE HCL IN 0.9% NACL 1 MG/10 ML
SYRINGE (ML) INTRAVENOUS
Status: DISCONTINUED | OUTPATIENT
Start: 2024-12-11 | End: 2024-12-11 | Stop reason: SDUPTHER

## 2024-12-11 RX ORDER — SODIUM CHLORIDE 0.9 % (FLUSH) 0.9 %
5-40 SYRINGE (ML) INJECTION PRN
Status: DISCONTINUED | OUTPATIENT
Start: 2024-12-11 | End: 2024-12-11 | Stop reason: HOSPADM

## 2024-12-11 RX ORDER — SODIUM CHLORIDE 0.9 % (FLUSH) 0.9 %
5-40 SYRINGE (ML) INJECTION PRN
Status: DISCONTINUED | OUTPATIENT
Start: 2024-12-11 | End: 2024-12-18 | Stop reason: HOSPADM

## 2024-12-11 RX ORDER — SODIUM CHLORIDE 9 MG/ML
INJECTION, SOLUTION INTRAVENOUS PRN
Status: DISCONTINUED | OUTPATIENT
Start: 2024-12-11 | End: 2024-12-18 | Stop reason: HOSPADM

## 2024-12-11 RX ORDER — SUCCINYLCHOLINE CHLORIDE 20 MG/ML
INJECTION INTRAMUSCULAR; INTRAVENOUS
Status: DISCONTINUED | OUTPATIENT
Start: 2024-12-11 | End: 2024-12-11 | Stop reason: SDUPTHER

## 2024-12-11 RX ORDER — FENTANYL CITRATE 50 UG/ML
INJECTION, SOLUTION INTRAMUSCULAR; INTRAVENOUS
Status: DISCONTINUED | OUTPATIENT
Start: 2024-12-11 | End: 2024-12-11 | Stop reason: SDUPTHER

## 2024-12-11 RX ORDER — ONDANSETRON 2 MG/ML
4 INJECTION INTRAMUSCULAR; INTRAVENOUS EVERY 6 HOURS PRN
Status: DISCONTINUED | OUTPATIENT
Start: 2024-12-11 | End: 2024-12-18 | Stop reason: HOSPADM

## 2024-12-11 RX ORDER — LISINOPRIL 40 MG/1
40 TABLET ORAL DAILY
Status: DISCONTINUED | OUTPATIENT
Start: 2024-12-12 | End: 2024-12-18 | Stop reason: HOSPADM

## 2024-12-11 RX ORDER — LIDOCAINE HYDROCHLORIDE 20 MG/ML
INJECTION, SOLUTION INFILTRATION; PERINEURAL
Status: DISCONTINUED | OUTPATIENT
Start: 2024-12-11 | End: 2024-12-11 | Stop reason: SDUPTHER

## 2024-12-11 RX ORDER — TRANEXAMIC ACID 100 MG/ML
INJECTION, SOLUTION INTRAVENOUS
Status: DISCONTINUED | OUTPATIENT
Start: 2024-12-11 | End: 2024-12-11 | Stop reason: SDUPTHER

## 2024-12-11 RX ORDER — ONDANSETRON 2 MG/ML
4 INJECTION INTRAMUSCULAR; INTRAVENOUS
Status: DISCONTINUED | OUTPATIENT
Start: 2024-12-11 | End: 2024-12-11 | Stop reason: HOSPADM

## 2024-12-11 RX ORDER — NALOXONE HYDROCHLORIDE 0.4 MG/ML
INJECTION, SOLUTION INTRAMUSCULAR; INTRAVENOUS; SUBCUTANEOUS PRN
Status: DISCONTINUED | OUTPATIENT
Start: 2024-12-11 | End: 2024-12-11 | Stop reason: HOSPADM

## 2024-12-11 RX ADMIN — Medication 100 MCG: at 17:09

## 2024-12-11 RX ADMIN — HYDROMORPHONE HYDROCHLORIDE 0.5 MG: 1 INJECTION, SOLUTION INTRAMUSCULAR; INTRAVENOUS; SUBCUTANEOUS at 19:20

## 2024-12-11 RX ADMIN — TRANEXAMIC ACID 1000 MG: 100 INJECTION, SOLUTION INTRAVENOUS at 17:07

## 2024-12-11 RX ADMIN — SODIUM CHLORIDE: 9 INJECTION, SOLUTION INTRAVENOUS at 23:24

## 2024-12-11 RX ADMIN — FENTANYL CITRATE 50 MCG: 50 INJECTION, SOLUTION INTRAMUSCULAR; INTRAVENOUS at 19:30

## 2024-12-11 RX ADMIN — PROPOFOL 150 MG: 10 INJECTION, EMULSION INTRAVENOUS at 16:30

## 2024-12-11 RX ADMIN — WATER 2000 MG: 1 INJECTION INTRAMUSCULAR; INTRAVENOUS; SUBCUTANEOUS at 16:53

## 2024-12-11 RX ADMIN — LIDOCAINE HYDROCHLORIDE 100 MG: 20 INJECTION, SOLUTION INFILTRATION; PERINEURAL at 16:29

## 2024-12-11 RX ADMIN — SODIUM CHLORIDE: 9 INJECTION, SOLUTION INTRAVENOUS at 11:59

## 2024-12-11 RX ADMIN — HYDROMORPHONE HYDROCHLORIDE 0.5 MG: 2 INJECTION INTRAMUSCULAR; INTRAVENOUS; SUBCUTANEOUS at 19:06

## 2024-12-11 RX ADMIN — DEXAMETHASONE SODIUM PHOSPHATE 8 MG: 10 INJECTION, EMULSION INTRAMUSCULAR; INTRAVENOUS at 17:01

## 2024-12-11 RX ADMIN — SUGAMMADEX 200 MG: 100 INJECTION, SOLUTION INTRAVENOUS at 18:41

## 2024-12-11 RX ADMIN — HYDROMORPHONE HYDROCHLORIDE 0.5 MG: 1 INJECTION, SOLUTION INTRAMUSCULAR; INTRAVENOUS; SUBCUTANEOUS at 19:40

## 2024-12-11 RX ADMIN — HYDROMORPHONE HYDROCHLORIDE 0.5 MG: 2 INJECTION INTRAMUSCULAR; INTRAVENOUS; SUBCUTANEOUS at 16:58

## 2024-12-11 RX ADMIN — EPHEDRINE SULFATE 5 MG: 5 INJECTION INTRAVENOUS at 17:40

## 2024-12-11 RX ADMIN — Medication 100 MCG: at 18:16

## 2024-12-11 RX ADMIN — EPHEDRINE SULFATE 5 MG: 5 INJECTION INTRAVENOUS at 17:58

## 2024-12-11 RX ADMIN — EPHEDRINE SULFATE 10 MG: 5 INJECTION INTRAVENOUS at 17:18

## 2024-12-11 RX ADMIN — SODIUM CHLORIDE 1000 ML: 9 INJECTION, SOLUTION INTRAVENOUS at 22:17

## 2024-12-11 RX ADMIN — EPHEDRINE SULFATE 5 MG: 5 INJECTION INTRAVENOUS at 17:35

## 2024-12-11 RX ADMIN — HYDROMORPHONE HYDROCHLORIDE 0.5 MG: 1 INJECTION, SOLUTION INTRAMUSCULAR; INTRAVENOUS; SUBCUTANEOUS at 19:45

## 2024-12-11 RX ADMIN — FENTANYL CITRATE 50 MCG: 50 INJECTION, SOLUTION INTRAMUSCULAR; INTRAVENOUS at 19:25

## 2024-12-11 RX ADMIN — SODIUM CHLORIDE: 9 INJECTION, SOLUTION INTRAVENOUS at 17:40

## 2024-12-11 RX ADMIN — ONDANSETRON 4 MG: 2 INJECTION INTRAMUSCULAR; INTRAVENOUS at 17:01

## 2024-12-11 RX ADMIN — Medication 160 MG: at 16:30

## 2024-12-11 RX ADMIN — HYDROMORPHONE HYDROCHLORIDE 0.5 MG: 1 INJECTION, SOLUTION INTRAMUSCULAR; INTRAVENOUS; SUBCUTANEOUS at 19:15

## 2024-12-11 RX ADMIN — HYDROMORPHONE HYDROCHLORIDE 0.5 MG: 2 INJECTION INTRAMUSCULAR; INTRAVENOUS; SUBCUTANEOUS at 19:12

## 2024-12-11 RX ADMIN — FENTANYL CITRATE 100 MCG: 50 INJECTION, SOLUTION INTRAMUSCULAR; INTRAVENOUS at 16:27

## 2024-12-11 RX ADMIN — Medication 100 MCG: at 18:13

## 2024-12-11 RX ADMIN — Medication 100 MCG: at 18:29

## 2024-12-11 RX ADMIN — HYDROMORPHONE HYDROCHLORIDE 0.5 MG: 2 INJECTION INTRAMUSCULAR; INTRAVENOUS; SUBCUTANEOUS at 19:01

## 2024-12-11 ASSESSMENT — PAIN SCALES - GENERAL
PAINLEVEL_OUTOF10: 8
PAINLEVEL_OUTOF10: 10
PAINLEVEL_OUTOF10: 10
PAINLEVEL_OUTOF10: 8
PAINLEVEL_OUTOF10: 10
PAINLEVEL_OUTOF10: 8
PAINLEVEL_OUTOF10: 10
PAINLEVEL_OUTOF10: 10

## 2024-12-11 ASSESSMENT — PAIN SCALES - WONG BAKER
WONGBAKER_NUMERICALRESPONSE: HURTS A LITTLE BIT

## 2024-12-11 ASSESSMENT — PAIN - FUNCTIONAL ASSESSMENT
PAIN_FUNCTIONAL_ASSESSMENT: 0-10
PAIN_FUNCTIONAL_ASSESSMENT: 0-10

## 2024-12-11 ASSESSMENT — PAIN DESCRIPTION - DESCRIPTORS: DESCRIPTORS: SORE;DISCOMFORT

## 2024-12-11 NOTE — H&P
I have reviewed the history and physical and examined the patient.  I find no relevant changes.  I have reviewed with the patient and/or family members, during the preoperative office visit the risks, benefits, and alternatives to the procedure.    Jaydon Perry MD

## 2024-12-11 NOTE — H&P
[address] [address]                            PREOPERATIVE H&P      PATIENT NAME: KIM RODRIGUEZ             : 1950  MED REC NO: [id number]                       ROOM:   ACCOUNT NO: [id number]                       ADMIT DATE: 2024  PROVIDER: BHUPENDRA Goodson      PLANNED SURGERIES:  L2 to S1 lumbar laminectomy and posterior spinal fusion with L5-S1 interbody fusion with extension of the pelvis with Dr. Perry for the date of 2024 at [facility].    CHIEF COMPLAINT:  Low back pain and left greater than right lower extremity pain.    HISTORY OF PRESENT ILLNESS:  The patient is a 74-year-old female who presented to our office with complaints of significant low back pain and lower extremity radicular pain, worse in the left leg compared to the right.  She has struggled with these symptoms now for several months and has been through conservative treatment with physical therapy and an epidural injection, and ultimately has seen a continued decline in her mobility and quality of life.  She is unable to complete daily activities of living and has significant struggles with standing, walking, and bending.  She does describe bilateral leg pain and weakness as a result of these symptoms.  With her failure to improve,  she has elected to proceed with further operative management.  She has been cleared by Dr. Willie Jimenez, her family provider to undergo this operation.    ALLERGIES:  DRUG ALLERGIES INCLUDE DEMEROL.      PAST MEDICAL HISTORY:  Includes history of stomach cancer, GI issues, hypertension, anxiety, osteoporosis.    CURRENT MEDICATIONS:  Include gabapentin, triamcinolone, fluorouracil, omeprazole, nabumetone, duloxetine, vitamin D2, imiquimod, Neurontin, balsalazide, Citracal, budesonide, lisinopril, hydroxychloroquine, omeprazole, and diclofenac.    PAST SURGICAL HISTORY:  Includes stomach cancer

## 2024-12-11 NOTE — ANESTHESIA PROCEDURE NOTES
Arterial Line:    An arterial line was placed using surface landmarks, in the OR for the following indication(s): continuous blood pressure monitoring.    A 20 gauge (size), 1 and 3/8 inch (length), Arrow (type) catheter was placed, Seldinger technique not used, into the left radial artery, secured by tape and Tegaderm.  Anesthesia type: [id number]/11/2024 4:30 PM12/11/2024 10:40 AM  Anesthesiologist: David Banks DO  Resident/CRNA: Philip Joseph APRN - CRNA  Performed: Resident/CRNA   Preanesthetic Checklist  Completed: patient identified, IV checked, site marked, risks and benefits discussed, surgical/procedural consents, equipment checked, pre-op evaluation, timeout performed, anesthesia consent given, oxygen available, monitors applied/VS acknowledged, fire risk safety assessment completed and verbalized and blood product R/B/A discussed and consented

## 2024-12-11 NOTE — BRIEF OP NOTE
Brief Postoperative Note      Patient: Nancy Chavez  YOB: 1950  MRN: [id number]    Date of Procedure: 12/11/2024    Pre-Op Diagnosis Codes:      * Spinal stenosis, lumbar region, with neurogenic claudication [M48.062]    Post-Op Diagnosis: Same       Procedure(s):  L2-L3, L3-L4, L4-L5, L5-S1 Lami    Surgeon(s):  Jaydon Perry MD    Assistant:  First Assistant: Redd Farnsworth MA  Physician Assistant: Lopez Bone PA-C    Anesthesia: General    Estimated Blood Loss (mL): 600    Complications: None    Specimens:   * No specimens in log *    Implants:  * No implants in log *      Drains:   Closed/Suction Drain Inferior;Left Back Accordion (Active)   Site Description Clean, dry & intact 12/11/24 1835   Dressing Status New dressing applied 12/11/24 1835   Drainage Appearance Bloody 12/11/24 1835       Closed/Suction Drain Inferior;Right Back Accordion (Active)   Site Description Clean, dry & intact;Clean;Dry 12/11/24 1834   Dressing Status New dressing applied 12/11/24 1834   Drainage Appearance Bloody 12/11/24 1834       Urinary Catheter 12/11/24 Constantino-Temperature;Constantino (Active)       Findings:  Infection Present At Time Of Surgery (PATOS) (choose all levels that have infection present):  No infection present  Other Findings: same    Electronically signed by Jaydon Perry MD on 12/11/2024 at 6:49 PM

## 2024-12-11 NOTE — ANESTHESIA PRE PROCEDURE
\"HEPCAB\"    COVID-19 Screening (If Applicable): No results found for: \"COVID19\"        Anesthesia Evaluation  Patient summary reviewed   no history of anesthetic complications:   Airway: Mallampati: II          Dental:          Pulmonary:normal exam        (-) COPD and asthma                           Cardiovascular:  Exercise tolerance: good (>4 METS)  (+) hypertension (took ACE this morning):    (-) past MI, CAD and dysrhythmias                Neuro/Psych:   (+) neuromuscular disease:   (-) seizures and CVA           GI/Hepatic/Renal:   (+) GERD: well controlled     (-) liver disease and no renal disease       Endo/Other:    (+) : arthritis (Lupus):., malignancy/cancer (hx of stomach CA).    (-) diabetes mellitus, hypothyroidism, hyperthyroidism               Abdominal:             Vascular:     - DVT and PE.      Other Findings:       Anesthesia Plan      general     ASA 2     (PIV. Additional access can be obtained after induction if needed. Standard ASA monitors. IV/PO opioids and other adjuncts as needed for pain control. PACU post op for recovery.    Possible anesthetics complications were discussed with the patient, including but not limited to: PONV, damage to the airway and surrounding structures (teeth, lips, gums, tongue, etc.), adverse reactions to medicine, cardiac complications (MI, CHF, arrhythmias, etc.), respiratory complications (post-op ventilation, respiratory failure, etc.), neurologic complications (nerve damage, stroke, seizure), and death. The patient was given the opportunity to ask questions and all questions were answered to the patient's satisfaction. The patient is in agreement with the anesthetic plan.  )  Induction: intravenous.  arterial line    Anesthetic plan and risks discussed with patient and child/children.      Plan discussed with CRNA.                Gordo Metz,    12/11/2024

## 2024-12-12 LAB
BASOPHILS ABSOLUTE: 0 THOU/MM3 (ref 0–0.1)
BASOPHILS NFR BLD AUTO: 0.2 %
DEPRECATED RDW RBC AUTO: 44.8 FL (ref 35–45)
EOSINOPHIL NFR BLD AUTO: 0 %
EOSINOPHILS ABSOLUTE: 0 THOU/MM3 (ref 0–0.4)
ERYTHROCYTE [DISTWIDTH] IN BLOOD BY AUTOMATED COUNT: 12.9 % (ref 11.5–14.5)
HCT VFR BLD AUTO: 27.6 % (ref 37–47)
HGB BLD-MCNC: 8.9 GM/DL (ref 12–16)
IMM GRANULOCYTES # BLD AUTO: 0.06 THOU/MM3 (ref 0–0.07)
IMM GRANULOCYTES NFR BLD AUTO: 0.6 %
LYMPHOCYTES ABSOLUTE: 0.6 THOU/MM3 (ref 1–4.8)
LYMPHOCYTES NFR BLD AUTO: 5.5 %
MCH RBC QN AUTO: 30.5 PG (ref 26–33)
MCHC RBC AUTO-ENTMCNC: 32.2 GM/DL (ref 32.2–35.5)
MCV RBC AUTO: 94.5 FL (ref 81–99)
MONOCYTES ABSOLUTE: 0.8 THOU/MM3 (ref 0.4–1.3)
MONOCYTES NFR BLD AUTO: 7.7 %
NEUTROPHILS ABSOLUTE: 9.1 THOU/MM3 (ref 1.8–7.7)
NEUTROPHILS NFR BLD AUTO: 86 %
NRBC BLD AUTO-RTO: 0 /100 WBC
PLATELET # BLD AUTO: 171 THOU/MM3 (ref 130–400)
PMV BLD AUTO: 9.7 FL (ref 9.4–12.4)
RBC # BLD AUTO: 2.92 MILL/MM3 (ref 4.2–5.4)
REASON FOR REJECTION: NORMAL
REJECTED TEST: NORMAL
WBC # BLD AUTO: 10.6 THOU/MM3 (ref 4.8–10.8)

## 2024-12-12 PROCEDURE — 01NR0ZZ RELEASE SACRAL NERVE, OPEN APPROACH: ICD-10-PCS | Performed by: ORTHOPAEDIC SURGERY

## 2024-12-12 PROCEDURE — 6370000000 HC RX 637 (ALT 250 FOR IP): Performed by: NURSE PRACTITIONER

## 2024-12-12 PROCEDURE — 85025 COMPLETE CBC W/AUTO DIFF WBC: CPT

## 2024-12-12 PROCEDURE — 97166 OT EVAL MOD COMPLEX 45 MIN: CPT

## 2024-12-12 PROCEDURE — 36415 COLL VENOUS BLD VENIPUNCTURE: CPT

## 2024-12-12 PROCEDURE — 2580000003 HC RX 258: Performed by: PHYSICIAN ASSISTANT

## 2024-12-12 PROCEDURE — 6360000002 HC RX W HCPCS: Performed by: PHYSICIAN ASSISTANT

## 2024-12-12 PROCEDURE — 6370000000 HC RX 637 (ALT 250 FOR IP): Performed by: PHYSICIAN ASSISTANT

## 2024-12-12 PROCEDURE — 01NB0ZZ RELEASE LUMBAR NERVE, OPEN APPROACH: ICD-10-PCS | Performed by: ORTHOPAEDIC SURGERY

## 2024-12-12 PROCEDURE — 97530 THERAPEUTIC ACTIVITIES: CPT

## 2024-12-12 PROCEDURE — 99222 1ST HOSP IP/OBS MODERATE 55: CPT | Performed by: NURSE PRACTITIONER

## 2024-12-12 PROCEDURE — 1200000000 HC SEMI PRIVATE

## 2024-12-12 RX ORDER — DEXAMETHASONE SODIUM PHOSPHATE 4 MG/ML
8 INJECTION, SOLUTION INTRA-ARTICULAR; INTRALESIONAL; INTRAMUSCULAR; INTRAVENOUS; SOFT TISSUE EVERY 8 HOURS
Status: COMPLETED | OUTPATIENT
Start: 2024-12-12 | End: 2024-12-13

## 2024-12-12 RX ORDER — FERROUS SULFATE 325(65) MG
325 TABLET ORAL 2 TIMES DAILY WITH MEALS
Status: DISCONTINUED | OUTPATIENT
Start: 2024-12-12 | End: 2024-12-18 | Stop reason: HOSPADM

## 2024-12-12 RX ADMIN — SENNOSIDES AND DOCUSATE SODIUM 1 TABLET: 50; 8.6 TABLET ORAL at 10:15

## 2024-12-12 RX ADMIN — SODIUM CHLORIDE: 9 INJECTION, SOLUTION INTRAVENOUS at 21:36

## 2024-12-12 RX ADMIN — OXYCODONE HYDROCHLORIDE AND ACETAMINOPHEN 2 TABLET: 5; 325 TABLET ORAL at 22:51

## 2024-12-12 RX ADMIN — HYDROXYCHLOROQUINE SULFATE 200 MG: 200 TABLET ORAL at 20:44

## 2024-12-12 RX ADMIN — FERROUS SULFATE TAB 325 MG (65 MG ELEMENTAL FE) 325 MG: 325 (65 FE) TAB at 16:18

## 2024-12-12 RX ADMIN — PANTOPRAZOLE SODIUM 40 MG: 40 TABLET, DELAYED RELEASE ORAL at 10:15

## 2024-12-12 RX ADMIN — HYDROMORPHONE HYDROCHLORIDE 0.5 MG: 1 INJECTION, SOLUTION INTRAMUSCULAR; INTRAVENOUS; SUBCUTANEOUS at 02:45

## 2024-12-12 RX ADMIN — OXYCODONE HYDROCHLORIDE AND ACETAMINOPHEN 1 TABLET: 5; 325 TABLET ORAL at 14:51

## 2024-12-12 RX ADMIN — PANTOPRAZOLE SODIUM 40 MG: 40 TABLET, DELAYED RELEASE ORAL at 16:18

## 2024-12-12 RX ADMIN — SENNOSIDES AND DOCUSATE SODIUM 1 TABLET: 50; 8.6 TABLET ORAL at 20:44

## 2024-12-12 RX ADMIN — SODIUM CHLORIDE, PRESERVATIVE FREE 5 ML: 5 INJECTION INTRAVENOUS at 20:44

## 2024-12-12 RX ADMIN — FERROUS SULFATE TAB 325 MG (65 MG ELEMENTAL FE) 325 MG: 325 (65 FE) TAB at 10:16

## 2024-12-12 RX ADMIN — CYCLOBENZAPRINE 10 MG: 10 TABLET, FILM COATED ORAL at 22:53

## 2024-12-12 RX ADMIN — ONDANSETRON 4 MG: 2 INJECTION INTRAMUSCULAR; INTRAVENOUS at 12:04

## 2024-12-12 RX ADMIN — OXYCODONE HYDROCHLORIDE AND ACETAMINOPHEN 1 TABLET: 5; 325 TABLET ORAL at 18:24

## 2024-12-12 RX ADMIN — HYDROXYCHLOROQUINE SULFATE 200 MG: 200 TABLET ORAL at 10:15

## 2024-12-12 RX ADMIN — OXYCODONE HYDROCHLORIDE AND ACETAMINOPHEN 2 TABLET: 5; 325 TABLET ORAL at 10:03

## 2024-12-12 RX ADMIN — SODIUM CHLORIDE: 9 INJECTION, SOLUTION INTRAVENOUS at 11:57

## 2024-12-12 ASSESSMENT — PAIN - FUNCTIONAL ASSESSMENT
PAIN_FUNCTIONAL_ASSESSMENT: PREVENTS OR INTERFERES SOME ACTIVE ACTIVITIES AND ADLS
PAIN_FUNCTIONAL_ASSESSMENT: PREVENTS OR INTERFERES WITH MANY ACTIVE NOT PASSIVE ACTIVITIES
PAIN_FUNCTIONAL_ASSESSMENT: PREVENTS OR INTERFERES SOME ACTIVE ACTIVITIES AND ADLS

## 2024-12-12 ASSESSMENT — PAIN SCALES - GENERAL
PAINLEVEL_OUTOF10: 8
PAINLEVEL_OUTOF10: 10
PAINLEVEL_OUTOF10: 4
PAINLEVEL_OUTOF10: 8
PAINLEVEL_OUTOF10: 8
PAINLEVEL_OUTOF10: 4
PAINLEVEL_OUTOF10: 8
PAINLEVEL_OUTOF10: 9
PAINLEVEL_OUTOF10: 5
PAINLEVEL_OUTOF10: 9
PAINLEVEL_OUTOF10: 5

## 2024-12-12 ASSESSMENT — PAIN DESCRIPTION - PAIN TYPE
TYPE: SURGICAL PAIN;ACUTE PAIN
TYPE: SURGICAL PAIN
TYPE: SURGICAL PAIN

## 2024-12-12 ASSESSMENT — PAIN DESCRIPTION - LOCATION
LOCATION: BACK

## 2024-12-12 ASSESSMENT — PAIN DESCRIPTION - DESCRIPTORS
DESCRIPTORS: ACHING
DESCRIPTORS: ACHING;SHARP
DESCRIPTORS: ACHING;SPASM
DESCRIPTORS: ACHING
DESCRIPTORS: ACHING
DESCRIPTORS: ACHING;SHARP
DESCRIPTORS: ACHING;DISCOMFORT
DESCRIPTORS: ACHING;DISCOMFORT;CRAMPING
DESCRIPTORS: ACHING

## 2024-12-12 ASSESSMENT — PAIN DESCRIPTION - ORIENTATION
ORIENTATION: LOWER
ORIENTATION: LOWER
ORIENTATION: MID
ORIENTATION: LOWER;MID
ORIENTATION: LOWER

## 2024-12-12 ASSESSMENT — PAIN DESCRIPTION - FREQUENCY: FREQUENCY: CONTINUOUS

## 2024-12-12 ASSESSMENT — PAIN DESCRIPTION - ONSET: ONSET: ON-GOING

## 2024-12-12 NOTE — CARE COORDINATION
12/12/24, 9:56 AM EST  DISCHARGE PLANNING EVALUATION    SW spoke with patient and daughter about discharge planning. IPR declined and they are now [facility] and [facility] as second choice.     SW called [facility] and they do not have any beds until early next week possibly. FELISA faxed referral.    SW made referral to Kyara at [facility].

## 2024-12-12 NOTE — CARE COORDINATION
Case Management Assessment Initial Evaluation    Date/Time of Evaluation: 12/12/2024 7:27 AM  Assessment Completed by: Lauren Saravia RN    If patient is discharged prior to next notation, then this note serves as note for discharge by case management.    Patient Name: Nancy Chavez                   YOB: 1950  Diagnosis: Spinal stenosis, lumbar region, with neurogenic claudication [M48.062]  Spinal stenosis of lumbar region with radiculopathy [M48.061, M54.16]                   Date / Time: 12/11/2024 11:12 AM  Location: [facility]09/009     Patient Admission Status: Inpatient   Readmission Risk Low 0-14, Mod 15-19), High > 20: Readmission Risk Score: 7.4    Current PCP: Sera Marina MD  Health Care Decision Makers:     Additional Case Management Notes: planned surgery with Dr Perry    POD 1, monitor lumbar drains x 2, low B/P so held pain meds. /hr, bowel meds, ileus prevention protocol, PT/OT evals needed.    Procedures: 12/11 L2-L3, L3-L4, L4-L5, L5-S1 Lami     Imaging: na    Patient Goals/Plan/Treatment Preferences: Met with Nancy who is from home alone. Daughter Elizabeth here earlier in day (from [place]). Pt requested IP rehab; managed medicare plan not likely to approve IP rehab stay.  [facility] preferred no beds available; SW made referral to [facility]. Has some DME, bed bathroom on second level home, will be precert for TCU or SNF destination.SW consulted and following.             12/12/24 0425   Service Assessment   Patient Orientation Alert and Oriented   Cognition Alert   History Provided By Patient   Primary Caregiver Self   Accompanied By/Relationship unaccomp   Support Systems Family Members   Patient's Healthcare Decision Maker is: Legal Next of Kin   PCP Verified by CM Yes  (Tameka Jimenez MD)   Last Visit to PCP Within last 3 months   Prior Functional Level Independent in ADLs/IADLs   Current Functional Level Assistance with the

## 2024-12-12 NOTE — CONSULTS
Physical Medicine & Rehabilitation   Consult Note      Admitting Physician: Jaydon Perry MD    Primary Care Provider: Sera Marina MD     Reason for Consult:  lumbar stenosis s/p lumbar mattson. Rehab needs     History of Present Illness:  Nancy Chavez is a 74 y.o. female admitted to [facility] on 12/11/2024. Patient  has a past medical history of Gastric cancer (HCC), Hypertension, Lupus, Meniere disease, OA (osteoarthritis), and Osteopenia.  Patient presented to [facility] for planned lumbar surgery.  Patient had been following with [facility] due to significant low back pain, lower extremity radicular pain worse on the left.  Patient had been struggling with the symptoms for several months.  Patient had undergone physical therapy and epidural rejection, but had ultimately seen decline in her mobility and quality of life.  Patient underwent MRI which showed moderate to severe spinal and foraminal stenosis L2-S1.  Patient elected to undergo an L2-S1 laminectomy with Dr. Perry on 12/11/2024.  Postop patient was admitted to .  Postop patient required fentanyl and Dilaudid IV.  This was changed to oral Percocet on 12/12.    12/12/2024: Patient seen today, sitting up in bedside chair.  Daughter at bedside.  Patient experiencing significant pain, 10 out of 10 in the low back and bilateral legs.  Patient experienced hypotension overnight, limiting the use of pain medications.  She has not taking a Percocet yet this morning, most updated blood pressure was 115/69.  Did alert nurse that patient was ready for pain medication and she requested to get back to bed.  Discussed with patient and family about qualifications for IPR, insurance restrictions, and other levels of therapy.  Explained that patient does not meet the medical complexity portion and would likely get denied by the insurance.  Patient and family understanding.  Obtained a list of SNF facilities in the area for family to review.  Patient

## 2024-12-12 NOTE — OP NOTE
[address] [address]                            OPERATIVE REPORT      PATIENT NAME: KIM RODRIGUEZ             : 1950  MED REC NO: [id number]                       ROOM: [facility]  ACCOUNT NO: [id number]                       ADMIT DATE: 2024  PROVIDER: Jaydon Perry MD      DATE OF PROCEDURE:  2024    SURGEON:  Jaydon Perry MD    PREOPERATIVE DIAGNOSES:    1. Lumbar spinal stenosis.  2. Grade 1 spondylolisthesis degenerative of L5-S1.  3. Lumbar spine disk disease, degenerative multilevel of lumbar spine, L2-L3, L3-L4, L4- L5, L5-S1.  4. Symptoms of low back pain and left greater than right lower extremity radiculopathy with neurogenic claudication of both lower extremities.    POSTOPERATIVE DIAGNOSES:    1. Lumbar spinal stenosis.  2. Grade 1 spondylolisthesis degenerative of L5-S1.  3. Lumbar spine disk disease, degenerative multilevel of lumbar spine, L2-L3, L3-L4, L4- L5, L5-S1.  4. Symptoms of low back pain and left greater than right lower extremity radiculopathy with neurogenic claudication of both lower extremities.    PROCEDURE:  L2, L3, L4, L5, and S1 laminectomies bilateral.    ASSISTANT:  Dr. Lopez Bone PA-C, who assisted with the patient positioning, prepping, draping, surgical retraction, decompressive laminectomy, wound closure, patient transfer, and all aspects of operation.    ANESTHESIA:  General.    BLOOD LOSS:  600 mL with return of Cell Saver 150 mL.    DRAINS:  Hemovac x 2.    COMPLICATIONS:  Zero.    INDICATION:  The patient presents, 74 years of age, having severe low back, hip, and leg pain, radicular in nature, unimproved despite conservative care.  Epidural injections and conservative care have not given adequate relief.  Her symptoms of low back pain radiated to both upper and lower extremities, and we have discussed the details of her diagnosis and treatment options.  I have offered

## 2024-12-12 NOTE — ANESTHESIA POSTPROCEDURE EVALUATION
Department of Anesthesiology  Postprocedure Note    Patient: Nancy Chavez  MRN: [id number]  YOB: 1950  Date of evaluation: 12/11/2024    Procedure Summary       Date: 12/11/24 Room / Location: [facility] OR  / [facility] OR    Anesthesia Start: 1626 Anesthesia Stop: 1907    Procedure: L2-L3, L3-L4, L4-L5, L5-S1 Lami (Spine Lumbar) Diagnosis:       Spinal stenosis, lumbar region, with neurogenic claudication      (Spinal stenosis, lumbar region, with neurogenic claudication [M48.062])    Surgeons: Jaydon Perry MD Responsible Provider: David Banks DO    Anesthesia Type: general ASA Status: 2            Anesthesia Type: No value filed.    Bobby Phase I: Bobby Score: 9    Bobby Phase II:      Anesthesia Post Evaluation    Patient location during evaluation: PACU  Patient participation: complete - patient participated  Level of consciousness: awake and alert  Pain score: 5  Airway patency: patent  Nausea & Vomiting: no nausea and no vomiting  Cardiovascular status: hemodynamically stable and blood pressure returned to baseline  Respiratory status: spontaneous ventilation, acceptable and nasal cannula  Hydration status: stable  Pain management: adequate and satisfactory to patient    No notable events documented.

## 2024-12-13 LAB
BASOPHILS ABSOLUTE: 0 THOU/MM3 (ref 0–0.1)
BASOPHILS NFR BLD AUTO: 0.2 %
DEPRECATED RDW RBC AUTO: 45 FL (ref 35–45)
EOSINOPHIL NFR BLD AUTO: 0 %
EOSINOPHILS ABSOLUTE: 0 THOU/MM3 (ref 0–0.4)
ERYTHROCYTE [DISTWIDTH] IN BLOOD BY AUTOMATED COUNT: 13.2 % (ref 11.5–14.5)
HCT VFR BLD AUTO: 26.4 % (ref 37–47)
HGB BLD-MCNC: 8.4 GM/DL (ref 12–16)
IMM GRANULOCYTES # BLD AUTO: 0.1 THOU/MM3 (ref 0–0.07)
IMM GRANULOCYTES NFR BLD AUTO: 1 %
LYMPHOCYTES ABSOLUTE: 0.5 THOU/MM3 (ref 1–4.8)
LYMPHOCYTES NFR BLD AUTO: 5 %
MCH RBC QN AUTO: 29.5 PG (ref 26–33)
MCHC RBC AUTO-ENTMCNC: 31.8 GM/DL (ref 32.2–35.5)
MCV RBC AUTO: 92.6 FL (ref 81–99)
MONOCYTES ABSOLUTE: 0.8 THOU/MM3 (ref 0.4–1.3)
MONOCYTES NFR BLD AUTO: 7.7 %
NEUTROPHILS ABSOLUTE: 8.5 THOU/MM3 (ref 1.8–7.7)
NEUTROPHILS NFR BLD AUTO: 86.1 %
NRBC BLD AUTO-RTO: 0 /100 WBC
PLATELET # BLD AUTO: 140 THOU/MM3 (ref 130–400)
PMV BLD AUTO: 9.6 FL (ref 9.4–12.4)
RBC # BLD AUTO: 2.85 MILL/MM3 (ref 4.2–5.4)
WBC # BLD AUTO: 9.9 THOU/MM3 (ref 4.8–10.8)

## 2024-12-13 PROCEDURE — 6360000002 HC RX W HCPCS: Performed by: STUDENT IN AN ORGANIZED HEALTH CARE EDUCATION/TRAINING PROGRAM

## 2024-12-13 PROCEDURE — 97535 SELF CARE MNGMENT TRAINING: CPT

## 2024-12-13 PROCEDURE — 2580000003 HC RX 258: Performed by: PHYSICIAN ASSISTANT

## 2024-12-13 PROCEDURE — 6370000000 HC RX 637 (ALT 250 FOR IP): Performed by: PHYSICIAN ASSISTANT

## 2024-12-13 PROCEDURE — 97162 PT EVAL MOD COMPLEX 30 MIN: CPT

## 2024-12-13 PROCEDURE — 97116 GAIT TRAINING THERAPY: CPT

## 2024-12-13 PROCEDURE — 85025 COMPLETE CBC W/AUTO DIFF WBC: CPT

## 2024-12-13 PROCEDURE — 6370000000 HC RX 637 (ALT 250 FOR IP): Performed by: NURSE PRACTITIONER

## 2024-12-13 PROCEDURE — 97110 THERAPEUTIC EXERCISES: CPT

## 2024-12-13 PROCEDURE — 36415 COLL VENOUS BLD VENIPUNCTURE: CPT

## 2024-12-13 PROCEDURE — 1200000000 HC SEMI PRIVATE

## 2024-12-13 RX ADMIN — LISINOPRIL 40 MG: 40 TABLET ORAL at 08:56

## 2024-12-13 RX ADMIN — OXYCODONE HYDROCHLORIDE AND ACETAMINOPHEN 2 TABLET: 5; 325 TABLET ORAL at 03:26

## 2024-12-13 RX ADMIN — HYDROXYCHLOROQUINE SULFATE 200 MG: 200 TABLET ORAL at 20:14

## 2024-12-13 RX ADMIN — OXYCODONE HYDROCHLORIDE AND ACETAMINOPHEN 2 TABLET: 5; 325 TABLET ORAL at 13:06

## 2024-12-13 RX ADMIN — SODIUM CHLORIDE, PRESERVATIVE FREE 10 ML: 5 INJECTION INTRAVENOUS at 08:57

## 2024-12-13 RX ADMIN — SODIUM CHLORIDE, PRESERVATIVE FREE 10 ML: 5 INJECTION INTRAVENOUS at 20:14

## 2024-12-13 RX ADMIN — OXYCODONE HYDROCHLORIDE AND ACETAMINOPHEN 2 TABLET: 5; 325 TABLET ORAL at 17:31

## 2024-12-13 RX ADMIN — PANTOPRAZOLE SODIUM 40 MG: 40 TABLET, DELAYED RELEASE ORAL at 15:05

## 2024-12-13 RX ADMIN — CYCLOBENZAPRINE 10 MG: 10 TABLET, FILM COATED ORAL at 20:14

## 2024-12-13 RX ADMIN — SENNOSIDES AND DOCUSATE SODIUM 1 TABLET: 50; 8.6 TABLET ORAL at 20:14

## 2024-12-13 RX ADMIN — HYDROXYCHLOROQUINE SULFATE 200 MG: 200 TABLET ORAL at 08:56

## 2024-12-13 RX ADMIN — PANTOPRAZOLE SODIUM 40 MG: 40 TABLET, DELAYED RELEASE ORAL at 05:26

## 2024-12-13 RX ADMIN — DEXAMETHASONE SODIUM PHOSPHATE 8 MG: 4 INJECTION, SOLUTION INTRA-ARTICULAR; INTRALESIONAL; INTRAMUSCULAR; INTRAVENOUS; SOFT TISSUE at 00:05

## 2024-12-13 RX ADMIN — CYCLOBENZAPRINE 10 MG: 10 TABLET, FILM COATED ORAL at 06:57

## 2024-12-13 RX ADMIN — DEXAMETHASONE SODIUM PHOSPHATE 8 MG: 4 INJECTION, SOLUTION INTRA-ARTICULAR; INTRALESIONAL; INTRAMUSCULAR; INTRAVENOUS; SOFT TISSUE at 16:09

## 2024-12-13 RX ADMIN — MAGNESIUM HYDROXIDE 30 ML: 1200 LIQUID ORAL at 20:14

## 2024-12-13 RX ADMIN — DEXAMETHASONE SODIUM PHOSPHATE 8 MG: 4 INJECTION, SOLUTION INTRA-ARTICULAR; INTRALESIONAL; INTRAMUSCULAR; INTRAVENOUS; SOFT TISSUE at 09:23

## 2024-12-13 RX ADMIN — OXYCODONE HYDROCHLORIDE AND ACETAMINOPHEN 2 TABLET: 5; 325 TABLET ORAL at 21:43

## 2024-12-13 RX ADMIN — FERROUS SULFATE TAB 325 MG (65 MG ELEMENTAL FE) 325 MG: 325 (65 FE) TAB at 08:56

## 2024-12-13 RX ADMIN — HYDROCHLOROTHIAZIDE 12.5 MG: 12.5 CAPSULE ORAL at 08:56

## 2024-12-13 RX ADMIN — FERROUS SULFATE TAB 325 MG (65 MG ELEMENTAL FE) 325 MG: 325 (65 FE) TAB at 16:15

## 2024-12-13 RX ADMIN — OXYCODONE HYDROCHLORIDE AND ACETAMINOPHEN 1 TABLET: 5; 325 TABLET ORAL at 08:55

## 2024-12-13 ASSESSMENT — PAIN SCALES - GENERAL
PAINLEVEL_OUTOF10: 5
PAINLEVEL_OUTOF10: 2
PAINLEVEL_OUTOF10: 6
PAINLEVEL_OUTOF10: 7
PAINLEVEL_OUTOF10: 3
PAINLEVEL_OUTOF10: 7
PAINLEVEL_OUTOF10: 6
PAINLEVEL_OUTOF10: 7
PAINLEVEL_OUTOF10: 5
PAINLEVEL_OUTOF10: 3
PAINLEVEL_OUTOF10: 7
PAINLEVEL_OUTOF10: 4
PAINLEVEL_OUTOF10: 7
PAINLEVEL_OUTOF10: 4
PAINLEVEL_OUTOF10: 6
PAINLEVEL_OUTOF10: 7
PAINLEVEL_OUTOF10: 6
PAINLEVEL_OUTOF10: 7
PAINLEVEL_OUTOF10: 6

## 2024-12-13 ASSESSMENT — PAIN DESCRIPTION - LOCATION
LOCATION: BACK

## 2024-12-13 ASSESSMENT — PAIN DESCRIPTION - ORIENTATION
ORIENTATION: LOWER
ORIENTATION: LOWER;MID

## 2024-12-13 ASSESSMENT — PAIN DESCRIPTION - DESCRIPTORS
DESCRIPTORS: ACHING;SHARP
DESCRIPTORS: ACHING
DESCRIPTORS: ACHING;SHARP
DESCRIPTORS: ACHING;SHARP
DESCRIPTORS: ACHING;SHARP;TINGLING
DESCRIPTORS: ACHING;SHARP
DESCRIPTORS: ACHING;SHARP;TINGLING
DESCRIPTORS: ACHING;SHARP
DESCRIPTORS: ACHING;SHARP;SPASM

## 2024-12-13 ASSESSMENT — PAIN DESCRIPTION - PAIN TYPE
TYPE: SURGICAL PAIN;ACUTE PAIN

## 2024-12-13 ASSESSMENT — PAIN - FUNCTIONAL ASSESSMENT
PAIN_FUNCTIONAL_ASSESSMENT: PREVENTS OR INTERFERES SOME ACTIVE ACTIVITIES AND ADLS

## 2024-12-13 ASSESSMENT — PAIN DESCRIPTION - ONSET
ONSET: ON-GOING

## 2024-12-13 ASSESSMENT — PAIN DESCRIPTION - FREQUENCY
FREQUENCY: CONTINUOUS

## 2024-12-13 ASSESSMENT — PAIN DESCRIPTION - DIRECTION: RADIATING_TOWARDS: DOWN BILATERAL LEGS

## 2024-12-13 NOTE — CARE COORDINATION
12/13/24, 1:17 PM EST    DISCHARGE ON GOING EVALUATION    Nancy MURRIETA [facility] day: 2  Location: -09/009-A Reason for admit: Spinal stenosis, lumbar region, with neurogenic claudication [M48.062]  Spinal stenosis of lumbar region with radiculopathy [M48.061, M54.16]     Procedures:   12/11/24:  L2-L3, L3-L4, L4-L5, L5-S1 Lami by Dr Perry    Imaging since last note: na    Barriers to Discharge: POD 2, monitor bilat drain outputs, cont therapy and pain control. No BM since OR. Bowel meds ordered. Hgb 8.4, soft B/P. /hr, Decadron IV, Flexeril.   PCP: Sera Marina MD  Readmission Risk Score: 10.7    Patient Goals/Plan/Treatment Preferences: From home alone and currently no beds at [facility]; [facility]. Will be precert once SNF accepts pt.

## 2024-12-13 NOTE — CARE COORDINATION
12/13/24, 11:03 AM EST  DISCHARGE PLANNING EVALUATION    SW received a voicemail from Malka at [facility] and they do not have any beds available.     FELISA called and left voicemail for Kyara at [facility] asking for a call back regarding referral.     FELISA spoke with patient and notified her that there is no availability at [facility] and SW is stilling waiting to hear back from [facility]. She reported that her third choice would be [facility].

## 2024-12-14 LAB
BASOPHILS ABSOLUTE: 0 THOU/MM3 (ref 0–0.1)
BASOPHILS NFR BLD AUTO: 0.1 %
DEPRECATED RDW RBC AUTO: 44.4 FL (ref 35–45)
EOSINOPHIL NFR BLD AUTO: 0 %
EOSINOPHILS ABSOLUTE: 0 THOU/MM3 (ref 0–0.4)
ERYTHROCYTE [DISTWIDTH] IN BLOOD BY AUTOMATED COUNT: 13.2 % (ref 11.5–14.5)
HCT VFR BLD AUTO: 26 % (ref 37–47)
HGB BLD-MCNC: 8.5 GM/DL (ref 12–16)
IMM GRANULOCYTES # BLD AUTO: 0.14 THOU/MM3 (ref 0–0.07)
IMM GRANULOCYTES NFR BLD AUTO: 1.1 %
LYMPHOCYTES ABSOLUTE: 1 THOU/MM3 (ref 1–4.8)
LYMPHOCYTES NFR BLD AUTO: 7.3 %
MCH RBC QN AUTO: 30.1 PG (ref 26–33)
MCHC RBC AUTO-ENTMCNC: 32.7 GM/DL (ref 32.2–35.5)
MCV RBC AUTO: 92.2 FL (ref 81–99)
MONOCYTES ABSOLUTE: 1.4 THOU/MM3 (ref 0.4–1.3)
MONOCYTES NFR BLD AUTO: 10.9 %
NEUTROPHILS ABSOLUTE: 10.6 THOU/MM3 (ref 1.8–7.7)
NEUTROPHILS NFR BLD AUTO: 80.6 %
NRBC BLD AUTO-RTO: 0 /100 WBC
PLATELET # BLD AUTO: 190 THOU/MM3 (ref 130–400)
PMV BLD AUTO: 9.9 FL (ref 9.4–12.4)
RBC # BLD AUTO: 2.82 MILL/MM3 (ref 4.2–5.4)
WBC # BLD AUTO: 13.2 THOU/MM3 (ref 4.8–10.8)

## 2024-12-14 PROCEDURE — 97116 GAIT TRAINING THERAPY: CPT

## 2024-12-14 PROCEDURE — 6370000000 HC RX 637 (ALT 250 FOR IP): Performed by: PHYSICIAN ASSISTANT

## 2024-12-14 PROCEDURE — 36415 COLL VENOUS BLD VENIPUNCTURE: CPT

## 2024-12-14 PROCEDURE — 1200000000 HC SEMI PRIVATE

## 2024-12-14 PROCEDURE — 97535 SELF CARE MNGMENT TRAINING: CPT

## 2024-12-14 PROCEDURE — 2580000003 HC RX 258: Performed by: PHYSICIAN ASSISTANT

## 2024-12-14 PROCEDURE — 85025 COMPLETE CBC W/AUTO DIFF WBC: CPT

## 2024-12-14 PROCEDURE — 6370000000 HC RX 637 (ALT 250 FOR IP): Performed by: NURSE PRACTITIONER

## 2024-12-14 RX ADMIN — OXYCODONE HYDROCHLORIDE AND ACETAMINOPHEN 2 TABLET: 5; 325 TABLET ORAL at 20:30

## 2024-12-14 RX ADMIN — SENNOSIDES AND DOCUSATE SODIUM 1 TABLET: 50; 8.6 TABLET ORAL at 20:26

## 2024-12-14 RX ADMIN — HYDROCHLOROTHIAZIDE 12.5 MG: 12.5 CAPSULE ORAL at 08:59

## 2024-12-14 RX ADMIN — HYDROXYCHLOROQUINE SULFATE 200 MG: 200 TABLET ORAL at 20:26

## 2024-12-14 RX ADMIN — OXYCODONE HYDROCHLORIDE AND ACETAMINOPHEN 2 TABLET: 5; 325 TABLET ORAL at 15:37

## 2024-12-14 RX ADMIN — OXYCODONE HYDROCHLORIDE AND ACETAMINOPHEN 2 TABLET: 5; 325 TABLET ORAL at 08:59

## 2024-12-14 RX ADMIN — FERROUS SULFATE TAB 325 MG (65 MG ELEMENTAL FE) 325 MG: 325 (65 FE) TAB at 15:37

## 2024-12-14 RX ADMIN — HYDROXYCHLOROQUINE SULFATE 200 MG: 200 TABLET ORAL at 08:59

## 2024-12-14 RX ADMIN — SODIUM CHLORIDE, PRESERVATIVE FREE 10 ML: 5 INJECTION INTRAVENOUS at 20:25

## 2024-12-14 RX ADMIN — LISINOPRIL 40 MG: 40 TABLET ORAL at 08:59

## 2024-12-14 RX ADMIN — OXYCODONE HYDROCHLORIDE AND ACETAMINOPHEN 1 TABLET: 5; 325 TABLET ORAL at 03:37

## 2024-12-14 RX ADMIN — FERROUS SULFATE TAB 325 MG (65 MG ELEMENTAL FE) 325 MG: 325 (65 FE) TAB at 08:59

## 2024-12-14 RX ADMIN — SODIUM CHLORIDE, PRESERVATIVE FREE 10 ML: 5 INJECTION INTRAVENOUS at 08:59

## 2024-12-14 RX ADMIN — CYCLOBENZAPRINE 10 MG: 10 TABLET, FILM COATED ORAL at 21:19

## 2024-12-14 RX ADMIN — CYCLOBENZAPRINE 10 MG: 10 TABLET, FILM COATED ORAL at 05:37

## 2024-12-14 RX ADMIN — POLYETHYLENE GLYCOL 3350 17 G: 17 POWDER, FOR SOLUTION ORAL at 08:59

## 2024-12-14 RX ADMIN — BISACODYL 5 MG: 5 TABLET, COATED ORAL at 08:59

## 2024-12-14 RX ADMIN — PANTOPRAZOLE SODIUM 40 MG: 40 TABLET, DELAYED RELEASE ORAL at 05:35

## 2024-12-14 RX ADMIN — SENNOSIDES AND DOCUSATE SODIUM 1 TABLET: 50; 8.6 TABLET ORAL at 08:59

## 2024-12-14 RX ADMIN — PANTOPRAZOLE SODIUM 40 MG: 40 TABLET, DELAYED RELEASE ORAL at 15:37

## 2024-12-14 ASSESSMENT — PAIN DESCRIPTION - ORIENTATION
ORIENTATION: RIGHT
ORIENTATION: LOWER
ORIENTATION: LOWER;MID

## 2024-12-14 ASSESSMENT — PAIN SCALES - GENERAL
PAINLEVEL_OUTOF10: 6
PAINLEVEL_OUTOF10: 5
PAINLEVEL_OUTOF10: 5
PAINLEVEL_OUTOF10: 7

## 2024-12-14 ASSESSMENT — PAIN DESCRIPTION - PAIN TYPE: TYPE: SURGICAL PAIN

## 2024-12-14 ASSESSMENT — PAIN - FUNCTIONAL ASSESSMENT
PAIN_FUNCTIONAL_ASSESSMENT: PREVENTS OR INTERFERES SOME ACTIVE ACTIVITIES AND ADLS
PAIN_FUNCTIONAL_ASSESSMENT: PREVENTS OR INTERFERES SOME ACTIVE ACTIVITIES AND ADLS

## 2024-12-14 ASSESSMENT — PAIN DESCRIPTION - DESCRIPTORS
DESCRIPTORS: ACHING;SHARP
DESCRIPTORS: ACHING
DESCRIPTORS: ACHING

## 2024-12-14 ASSESSMENT — PAIN DESCRIPTION - LOCATION
LOCATION: BACK
LOCATION: BACK
LOCATION: BUTTOCKS

## 2024-12-15 LAB
BASOPHILS ABSOLUTE: 0 THOU/MM3 (ref 0–0.1)
BASOPHILS NFR BLD AUTO: 0.3 %
DEPRECATED RDW RBC AUTO: 46.5 FL (ref 35–45)
EOSINOPHIL NFR BLD AUTO: 1.5 %
EOSINOPHILS ABSOLUTE: 0.1 THOU/MM3 (ref 0–0.4)
ERYTHROCYTE [DISTWIDTH] IN BLOOD BY AUTOMATED COUNT: 13.6 % (ref 11.5–14.5)
HCT VFR BLD AUTO: 22.7 % (ref 37–47)
HGB BLD-MCNC: 7.4 GM/DL (ref 12–16)
IMM GRANULOCYTES # BLD AUTO: 0.08 THOU/MM3 (ref 0–0.07)
IMM GRANULOCYTES NFR BLD AUTO: 1.2 %
LYMPHOCYTES ABSOLUTE: 1.5 THOU/MM3 (ref 1–4.8)
LYMPHOCYTES NFR BLD AUTO: 22.3 %
MCH RBC QN AUTO: 30.5 PG (ref 26–33)
MCHC RBC AUTO-ENTMCNC: 32.6 GM/DL (ref 32.2–35.5)
MCV RBC AUTO: 93.4 FL (ref 81–99)
MONOCYTES ABSOLUTE: 1 THOU/MM3 (ref 0.4–1.3)
MONOCYTES NFR BLD AUTO: 14.3 %
NEUTROPHILS ABSOLUTE: 4 THOU/MM3 (ref 1.8–7.7)
NEUTROPHILS NFR BLD AUTO: 60.4 %
NRBC BLD AUTO-RTO: 0 /100 WBC
PLATELET # BLD AUTO: 155 THOU/MM3 (ref 130–400)
PMV BLD AUTO: 9.9 FL (ref 9.4–12.4)
RBC # BLD AUTO: 2.43 MILL/MM3 (ref 4.2–5.4)
WBC # BLD AUTO: 6.7 THOU/MM3 (ref 4.8–10.8)

## 2024-12-15 PROCEDURE — 85025 COMPLETE CBC W/AUTO DIFF WBC: CPT

## 2024-12-15 PROCEDURE — 2580000003 HC RX 258: Performed by: PHYSICIAN ASSISTANT

## 2024-12-15 PROCEDURE — 36415 COLL VENOUS BLD VENIPUNCTURE: CPT

## 2024-12-15 PROCEDURE — 1200000000 HC SEMI PRIVATE

## 2024-12-15 PROCEDURE — 6370000000 HC RX 637 (ALT 250 FOR IP): Performed by: PHYSICIAN ASSISTANT

## 2024-12-15 PROCEDURE — 6370000000 HC RX 637 (ALT 250 FOR IP): Performed by: NURSE PRACTITIONER

## 2024-12-15 RX ORDER — DOCUSATE SODIUM 100 MG/1
100 CAPSULE, LIQUID FILLED ORAL 2 TIMES DAILY
Status: DISCONTINUED | OUTPATIENT
Start: 2024-12-15 | End: 2024-12-18 | Stop reason: HOSPADM

## 2024-12-15 RX ADMIN — OXYCODONE HYDROCHLORIDE AND ACETAMINOPHEN 2 TABLET: 5; 325 TABLET ORAL at 18:41

## 2024-12-15 RX ADMIN — HYDROCHLOROTHIAZIDE 12.5 MG: 12.5 CAPSULE ORAL at 08:31

## 2024-12-15 RX ADMIN — HYDROXYCHLOROQUINE SULFATE 200 MG: 200 TABLET ORAL at 08:31

## 2024-12-15 RX ADMIN — OXYCODONE HYDROCHLORIDE AND ACETAMINOPHEN 2 TABLET: 5; 325 TABLET ORAL at 14:10

## 2024-12-15 RX ADMIN — CYCLOBENZAPRINE 10 MG: 10 TABLET, FILM COATED ORAL at 20:40

## 2024-12-15 RX ADMIN — PANTOPRAZOLE SODIUM 40 MG: 40 TABLET, DELAYED RELEASE ORAL at 05:43

## 2024-12-15 RX ADMIN — SODIUM CHLORIDE, PRESERVATIVE FREE 10 ML: 5 INJECTION INTRAVENOUS at 20:41

## 2024-12-15 RX ADMIN — SENNOSIDES AND DOCUSATE SODIUM 1 TABLET: 50; 8.6 TABLET ORAL at 20:40

## 2024-12-15 RX ADMIN — LISINOPRIL 40 MG: 40 TABLET ORAL at 08:31

## 2024-12-15 RX ADMIN — PANTOPRAZOLE SODIUM 40 MG: 40 TABLET, DELAYED RELEASE ORAL at 14:10

## 2024-12-15 RX ADMIN — OXYCODONE HYDROCHLORIDE AND ACETAMINOPHEN 2 TABLET: 5; 325 TABLET ORAL at 09:57

## 2024-12-15 RX ADMIN — BISACODYL 5 MG: 5 TABLET, COATED ORAL at 08:31

## 2024-12-15 RX ADMIN — DOCUSATE SODIUM 100 MG: 100 CAPSULE, LIQUID FILLED ORAL at 20:40

## 2024-12-15 RX ADMIN — CYCLOBENZAPRINE 10 MG: 10 TABLET, FILM COATED ORAL at 08:31

## 2024-12-15 RX ADMIN — SODIUM CHLORIDE, PRESERVATIVE FREE 10 ML: 5 INJECTION INTRAVENOUS at 08:31

## 2024-12-15 RX ADMIN — OXYCODONE HYDROCHLORIDE AND ACETAMINOPHEN 1 TABLET: 5; 325 TABLET ORAL at 05:43

## 2024-12-15 RX ADMIN — FERROUS SULFATE TAB 325 MG (65 MG ELEMENTAL FE) 325 MG: 325 (65 FE) TAB at 18:41

## 2024-12-15 RX ADMIN — DOCUSATE SODIUM 100 MG: 100 CAPSULE, LIQUID FILLED ORAL at 09:57

## 2024-12-15 RX ADMIN — HYDROXYCHLOROQUINE SULFATE 200 MG: 200 TABLET ORAL at 20:40

## 2024-12-15 RX ADMIN — OXYCODONE HYDROCHLORIDE AND ACETAMINOPHEN 2 TABLET: 5; 325 TABLET ORAL at 23:38

## 2024-12-15 RX ADMIN — FERROUS SULFATE TAB 325 MG (65 MG ELEMENTAL FE) 325 MG: 325 (65 FE) TAB at 08:31

## 2024-12-15 RX ADMIN — POLYETHYLENE GLYCOL 3350 17 G: 17 POWDER, FOR SOLUTION ORAL at 08:31

## 2024-12-15 RX ADMIN — SENNOSIDES AND DOCUSATE SODIUM 1 TABLET: 50; 8.6 TABLET ORAL at 08:31

## 2024-12-15 ASSESSMENT — PAIN DESCRIPTION - DESCRIPTORS
DESCRIPTORS: ACHING
DESCRIPTORS: ACHING
DESCRIPTORS: THROBBING
DESCRIPTORS: ACHING;SHARP;SHOOTING

## 2024-12-15 ASSESSMENT — PAIN DESCRIPTION - ORIENTATION
ORIENTATION: MID
ORIENTATION: MID;RIGHT;LEFT
ORIENTATION: MID

## 2024-12-15 ASSESSMENT — PAIN DESCRIPTION - LOCATION
LOCATION: BACK
LOCATION: BACK
LOCATION: BACK;LEG

## 2024-12-15 ASSESSMENT — PAIN - FUNCTIONAL ASSESSMENT
PAIN_FUNCTIONAL_ASSESSMENT: ACTIVITIES ARE NOT PREVENTED
PAIN_FUNCTIONAL_ASSESSMENT: 0-10
PAIN_FUNCTIONAL_ASSESSMENT: ACTIVITIES ARE NOT PREVENTED
PAIN_FUNCTIONAL_ASSESSMENT: ACTIVITIES ARE NOT PREVENTED

## 2024-12-15 ASSESSMENT — PAIN DESCRIPTION - PAIN TYPE
TYPE: SURGICAL PAIN
TYPE: SURGICAL PAIN

## 2024-12-15 ASSESSMENT — PAIN SCALES - GENERAL
PAINLEVEL_OUTOF10: 7
PAINLEVEL_OUTOF10: 7
PAINLEVEL_OUTOF10: 6
PAINLEVEL_OUTOF10: 9
PAINLEVEL_OUTOF10: 4
PAINLEVEL_OUTOF10: 8
PAINLEVEL_OUTOF10: 7
PAINLEVEL_OUTOF10: 7
PAINLEVEL_OUTOF10: 4

## 2024-12-15 ASSESSMENT — PAIN DESCRIPTION - ONSET
ONSET: ON-GOING
ONSET: ON-GOING

## 2024-12-15 ASSESSMENT — PAIN DESCRIPTION - FREQUENCY
FREQUENCY: INTERMITTENT
FREQUENCY: INTERMITTENT

## 2024-12-15 ASSESSMENT — PAIN DESCRIPTION - DIRECTION: RADIATING_TOWARDS: DOWN BILATERAL LEGS

## 2024-12-16 LAB
HCT VFR BLD AUTO: 24 % (ref 37–47)
HGB BLD-MCNC: 7.7 GM/DL (ref 12–16)

## 2024-12-16 PROCEDURE — 97530 THERAPEUTIC ACTIVITIES: CPT

## 2024-12-16 PROCEDURE — 2580000003 HC RX 258: Performed by: PHYSICIAN ASSISTANT

## 2024-12-16 PROCEDURE — 97535 SELF CARE MNGMENT TRAINING: CPT

## 2024-12-16 PROCEDURE — 85014 HEMATOCRIT: CPT

## 2024-12-16 PROCEDURE — 6370000000 HC RX 637 (ALT 250 FOR IP): Performed by: NURSE PRACTITIONER

## 2024-12-16 PROCEDURE — 85018 HEMOGLOBIN: CPT

## 2024-12-16 PROCEDURE — 6370000000 HC RX 637 (ALT 250 FOR IP): Performed by: PHYSICIAN ASSISTANT

## 2024-12-16 PROCEDURE — 97110 THERAPEUTIC EXERCISES: CPT

## 2024-12-16 PROCEDURE — 1200000000 HC SEMI PRIVATE

## 2024-12-16 PROCEDURE — 97116 GAIT TRAINING THERAPY: CPT

## 2024-12-16 PROCEDURE — 36415 COLL VENOUS BLD VENIPUNCTURE: CPT

## 2024-12-16 RX ORDER — CYCLOBENZAPRINE HCL 10 MG
10 TABLET ORAL 3 TIMES DAILY PRN
Qty: 50 TABLET | Refills: 0 | Status: SHIPPED | OUTPATIENT
Start: 2024-12-16 | End: 2024-12-26

## 2024-12-16 RX ORDER — OXYCODONE AND ACETAMINOPHEN 5; 325 MG/1; MG/1
1 TABLET ORAL EVERY 4 HOURS PRN
Qty: 42 TABLET | Refills: 0 | Status: SHIPPED | OUTPATIENT
Start: 2024-12-16 | End: 2024-12-23

## 2024-12-16 RX ADMIN — SODIUM CHLORIDE, PRESERVATIVE FREE 10 ML: 5 INJECTION INTRAVENOUS at 20:41

## 2024-12-16 RX ADMIN — HYDROXYCHLOROQUINE SULFATE 200 MG: 200 TABLET ORAL at 09:30

## 2024-12-16 RX ADMIN — FERROUS SULFATE TAB 325 MG (65 MG ELEMENTAL FE) 325 MG: 325 (65 FE) TAB at 09:30

## 2024-12-16 RX ADMIN — SODIUM CHLORIDE, PRESERVATIVE FREE 10 ML: 5 INJECTION INTRAVENOUS at 09:30

## 2024-12-16 RX ADMIN — DOCUSATE SODIUM 100 MG: 100 CAPSULE, LIQUID FILLED ORAL at 09:30

## 2024-12-16 RX ADMIN — LISINOPRIL 40 MG: 40 TABLET ORAL at 09:30

## 2024-12-16 RX ADMIN — POLYETHYLENE GLYCOL 3350 17 G: 17 POWDER, FOR SOLUTION ORAL at 09:30

## 2024-12-16 RX ADMIN — HYDROXYCHLOROQUINE SULFATE 200 MG: 200 TABLET ORAL at 20:40

## 2024-12-16 RX ADMIN — OXYCODONE HYDROCHLORIDE AND ACETAMINOPHEN 2 TABLET: 5; 325 TABLET ORAL at 05:23

## 2024-12-16 RX ADMIN — OXYCODONE HYDROCHLORIDE AND ACETAMINOPHEN 2 TABLET: 5; 325 TABLET ORAL at 23:53

## 2024-12-16 RX ADMIN — FERROUS SULFATE TAB 325 MG (65 MG ELEMENTAL FE) 325 MG: 325 (65 FE) TAB at 15:29

## 2024-12-16 RX ADMIN — PANTOPRAZOLE SODIUM 40 MG: 40 TABLET, DELAYED RELEASE ORAL at 15:29

## 2024-12-16 RX ADMIN — SENNOSIDES AND DOCUSATE SODIUM 1 TABLET: 50; 8.6 TABLET ORAL at 20:40

## 2024-12-16 RX ADMIN — OXYCODONE HYDROCHLORIDE AND ACETAMINOPHEN 2 TABLET: 5; 325 TABLET ORAL at 13:50

## 2024-12-16 RX ADMIN — PANTOPRAZOLE SODIUM 40 MG: 40 TABLET, DELAYED RELEASE ORAL at 05:23

## 2024-12-16 RX ADMIN — HYDROCHLOROTHIAZIDE 12.5 MG: 12.5 CAPSULE ORAL at 09:30

## 2024-12-16 RX ADMIN — BISACODYL 5 MG: 5 TABLET, COATED ORAL at 09:30

## 2024-12-16 RX ADMIN — SENNOSIDES AND DOCUSATE SODIUM 1 TABLET: 50; 8.6 TABLET ORAL at 09:30

## 2024-12-16 RX ADMIN — OXYCODONE HYDROCHLORIDE AND ACETAMINOPHEN 2 TABLET: 5; 325 TABLET ORAL at 09:30

## 2024-12-16 RX ADMIN — OXYCODONE HYDROCHLORIDE AND ACETAMINOPHEN 2 TABLET: 5; 325 TABLET ORAL at 17:54

## 2024-12-16 RX ADMIN — DOCUSATE SODIUM 100 MG: 100 CAPSULE, LIQUID FILLED ORAL at 20:40

## 2024-12-16 ASSESSMENT — PAIN SCALES - WONG BAKER
WONGBAKER_NUMERICALRESPONSE: NO HURT
WONGBAKER_NUMERICALRESPONSE: NO HURT

## 2024-12-16 ASSESSMENT — PAIN DESCRIPTION - ORIENTATION
ORIENTATION: RIGHT;LEFT;MID
ORIENTATION: MID

## 2024-12-16 ASSESSMENT — PAIN SCALES - GENERAL
PAINLEVEL_OUTOF10: 6
PAINLEVEL_OUTOF10: 7
PAINLEVEL_OUTOF10: 0
PAINLEVEL_OUTOF10: 6
PAINLEVEL_OUTOF10: 6
PAINLEVEL_OUTOF10: 5
PAINLEVEL_OUTOF10: 7
PAINLEVEL_OUTOF10: 6

## 2024-12-16 ASSESSMENT — PAIN DESCRIPTION - DESCRIPTORS
DESCRIPTORS: ACHING
DESCRIPTORS: ACHING;SHARP;SHOOTING
DESCRIPTORS: DULL

## 2024-12-16 ASSESSMENT — PAIN DESCRIPTION - ONSET
ONSET: ON-GOING
ONSET: ON-GOING

## 2024-12-16 ASSESSMENT — PAIN DESCRIPTION - DIRECTION
RADIATING_TOWARDS: DOWN BILATERAL LEGS
RADIATING_TOWARDS: DOWN BILATERAL LEGS

## 2024-12-16 ASSESSMENT — PAIN DESCRIPTION - LOCATION
LOCATION: BACK
LOCATION: BACK

## 2024-12-16 ASSESSMENT — PAIN DESCRIPTION - FREQUENCY
FREQUENCY: INTERMITTENT
FREQUENCY: INTERMITTENT

## 2024-12-16 ASSESSMENT — PAIN DESCRIPTION - PAIN TYPE: TYPE: SURGICAL PAIN

## 2024-12-16 NOTE — CARE COORDINATION
12/16/24, 1:26 PM EST    DISCHARGE PLANNING EVALUATION       Spoke with [facility] and they do not have any beds.  Spoke with patient and she stated that her next choice is [facility].  Reviewed therapy notes and patient walker over 150 feet, followed up with patient about need for ECF and insurance pre-cert.  Patient is currently reviewing home health list.  Patient is aware that can still try an ECF referral and pre-cert.

## 2024-12-16 NOTE — CARE COORDINATION
12/16/24, 3:11 PM EST    DISCHARGE PLANNING EVALUATION     Spoke with patient and her daughter and they would like to try for patient to go to [facility] for rehab.  Patient and daughter would like a referral to [facility]. Referral made to [facility] and also room and board prices given to daughter.  Daugther to be here tomorrow around lunch time as she lives several hours away.

## 2024-12-16 NOTE — CARE COORDINATION
12/16/24, 8:54 AM EST    DISCHARGE ON GOING EVALUATION    Nancy MURRIETA [facility] day: 5  Location: -09/009-A Reason for admit: Spinal stenosis, lumbar region, with neurogenic claudication [M48.062]  Spinal stenosis of lumbar region with radiculopathy [M48.061, M54.16]     Procedures: 12/11/24:  L2-L3, L3-L4, L4-L5, L5-S1 Lami by Dr Perry     Imaging since last note: na    Barriers to Discharge: POD # 5, bilat lumbar drains removed, dressing care, await precert for SNF    PCP: Sera Marina MD  Readmission Risk Score: 9.3    Patient Goals/Plan/Treatment Preferences: planning [facility]; await precert and SW follows.

## 2024-12-17 LAB
HCT VFR BLD AUTO: 25.3 % (ref 37–47)
HGB BLD-MCNC: 8.3 GM/DL (ref 12–16)

## 2024-12-17 PROCEDURE — 6370000000 HC RX 637 (ALT 250 FOR IP): Performed by: NURSE PRACTITIONER

## 2024-12-17 PROCEDURE — 36415 COLL VENOUS BLD VENIPUNCTURE: CPT

## 2024-12-17 PROCEDURE — 85014 HEMATOCRIT: CPT

## 2024-12-17 PROCEDURE — 6370000000 HC RX 637 (ALT 250 FOR IP): Performed by: PHYSICIAN ASSISTANT

## 2024-12-17 PROCEDURE — 85018 HEMOGLOBIN: CPT

## 2024-12-17 PROCEDURE — 97530 THERAPEUTIC ACTIVITIES: CPT

## 2024-12-17 PROCEDURE — 6360000002 HC RX W HCPCS: Performed by: PHYSICIAN ASSISTANT

## 2024-12-17 PROCEDURE — 2500000003 HC RX 250 WO HCPCS: Performed by: PHYSICIAN ASSISTANT

## 2024-12-17 PROCEDURE — 2580000003 HC RX 258: Performed by: PHYSICIAN ASSISTANT

## 2024-12-17 PROCEDURE — 1200000000 HC SEMI PRIVATE

## 2024-12-17 PROCEDURE — 97116 GAIT TRAINING THERAPY: CPT

## 2024-12-17 RX ADMIN — LISINOPRIL 40 MG: 40 TABLET ORAL at 07:58

## 2024-12-17 RX ADMIN — PANTOPRAZOLE SODIUM 40 MG: 40 TABLET, DELAYED RELEASE ORAL at 05:43

## 2024-12-17 RX ADMIN — OXYCODONE HYDROCHLORIDE AND ACETAMINOPHEN 2 TABLET: 5; 325 TABLET ORAL at 22:33

## 2024-12-17 RX ADMIN — FERROUS SULFATE TAB 325 MG (65 MG ELEMENTAL FE) 325 MG: 325 (65 FE) TAB at 07:58

## 2024-12-17 RX ADMIN — OXYCODONE HYDROCHLORIDE AND ACETAMINOPHEN 2 TABLET: 5; 325 TABLET ORAL at 10:02

## 2024-12-17 RX ADMIN — CYCLOBENZAPRINE 10 MG: 10 TABLET, FILM COATED ORAL at 01:08

## 2024-12-17 RX ADMIN — OXYCODONE HYDROCHLORIDE AND ACETAMINOPHEN 2 TABLET: 5; 325 TABLET ORAL at 14:36

## 2024-12-17 RX ADMIN — SODIUM CHLORIDE, PRESERVATIVE FREE 10 ML: 5 INJECTION INTRAVENOUS at 20:57

## 2024-12-17 RX ADMIN — SODIUM CHLORIDE, PRESERVATIVE FREE 10 ML: 5 INJECTION INTRAVENOUS at 07:57

## 2024-12-17 RX ADMIN — ONDANSETRON 4 MG: 2 INJECTION INTRAMUSCULAR; INTRAVENOUS at 10:49

## 2024-12-17 RX ADMIN — DOCUSATE SODIUM 100 MG: 100 CAPSULE, LIQUID FILLED ORAL at 07:57

## 2024-12-17 RX ADMIN — FERROUS SULFATE TAB 325 MG (65 MG ELEMENTAL FE) 325 MG: 325 (65 FE) TAB at 17:29

## 2024-12-17 RX ADMIN — DOCUSATE SODIUM AND BENZOCAINE 1 ENEMA: 283; 20 LIQUID RECTAL at 07:55

## 2024-12-17 RX ADMIN — ONDANSETRON 4 MG: 2 INJECTION INTRAMUSCULAR; INTRAVENOUS at 22:28

## 2024-12-17 RX ADMIN — HYDROCHLOROTHIAZIDE 12.5 MG: 12.5 CAPSULE ORAL at 07:58

## 2024-12-17 RX ADMIN — OXYCODONE HYDROCHLORIDE AND ACETAMINOPHEN 2 TABLET: 5; 325 TABLET ORAL at 05:43

## 2024-12-17 RX ADMIN — BISACODYL 5 MG: 5 TABLET, COATED ORAL at 07:58

## 2024-12-17 RX ADMIN — SENNOSIDES AND DOCUSATE SODIUM 1 TABLET: 50; 8.6 TABLET ORAL at 07:57

## 2024-12-17 RX ADMIN — SENNOSIDES AND DOCUSATE SODIUM 1 TABLET: 50; 8.6 TABLET ORAL at 20:56

## 2024-12-17 RX ADMIN — DOCUSATE SODIUM 100 MG: 100 CAPSULE, LIQUID FILLED ORAL at 20:56

## 2024-12-17 RX ADMIN — CYCLOBENZAPRINE 10 MG: 10 TABLET, FILM COATED ORAL at 10:02

## 2024-12-17 RX ADMIN — PANTOPRAZOLE SODIUM 40 MG: 40 TABLET, DELAYED RELEASE ORAL at 14:37

## 2024-12-17 RX ADMIN — HYDROXYCHLOROQUINE SULFATE 200 MG: 200 TABLET ORAL at 20:56

## 2024-12-17 RX ADMIN — HYDROXYCHLOROQUINE SULFATE 200 MG: 200 TABLET ORAL at 07:58

## 2024-12-17 ASSESSMENT — PAIN SCALES - GENERAL
PAINLEVEL_OUTOF10: 6
PAINLEVEL_OUTOF10: 5
PAINLEVEL_OUTOF10: 6
PAINLEVEL_OUTOF10: 4
PAINLEVEL_OUTOF10: 6
PAINLEVEL_OUTOF10: 4
PAINLEVEL_OUTOF10: 5
PAINLEVEL_OUTOF10: 3
PAINLEVEL_OUTOF10: 7
PAINLEVEL_OUTOF10: 7
PAINLEVEL_OUTOF10: 5
PAINLEVEL_OUTOF10: 8
PAINLEVEL_OUTOF10: 5

## 2024-12-17 ASSESSMENT — PAIN - FUNCTIONAL ASSESSMENT
PAIN_FUNCTIONAL_ASSESSMENT: ACTIVITIES ARE NOT PREVENTED
PAIN_FUNCTIONAL_ASSESSMENT: 0-10
PAIN_FUNCTIONAL_ASSESSMENT: 0-10
PAIN_FUNCTIONAL_ASSESSMENT: ACTIVITIES ARE NOT PREVENTED

## 2024-12-17 ASSESSMENT — PAIN DESCRIPTION - DESCRIPTORS
DESCRIPTORS: ACHING
DESCRIPTORS: ACHING

## 2024-12-17 ASSESSMENT — PAIN DESCRIPTION - LOCATION
LOCATION: BACK
LOCATION: BACK

## 2024-12-17 ASSESSMENT — PAIN DESCRIPTION - ORIENTATION
ORIENTATION: MID
ORIENTATION: MID

## 2024-12-17 NOTE — CARE COORDINATION
12/17/24, 9:44 AM EST    DISCHARGE PLANNING EVALUATION    Referral to [facility], snf can accept, will start precert.

## 2024-12-18 VITALS
WEIGHT: 138.8 LBS | HEIGHT: 61 IN | HEART RATE: 92 BPM | BODY MASS INDEX: 26.21 KG/M2 | TEMPERATURE: 98.2 F | SYSTOLIC BLOOD PRESSURE: 123 MMHG | RESPIRATION RATE: 17 BRPM | OXYGEN SATURATION: 97 % | DIASTOLIC BLOOD PRESSURE: 78 MMHG

## 2024-12-18 PROCEDURE — 97116 GAIT TRAINING THERAPY: CPT

## 2024-12-18 PROCEDURE — 97110 THERAPEUTIC EXERCISES: CPT

## 2024-12-18 PROCEDURE — 6370000000 HC RX 637 (ALT 250 FOR IP): Performed by: PHYSICIAN ASSISTANT

## 2024-12-18 PROCEDURE — 97530 THERAPEUTIC ACTIVITIES: CPT

## 2024-12-18 PROCEDURE — 2500000003 HC RX 250 WO HCPCS: Performed by: PHYSICIAN ASSISTANT

## 2024-12-18 PROCEDURE — 6370000000 HC RX 637 (ALT 250 FOR IP): Performed by: NURSE PRACTITIONER

## 2024-12-18 RX ADMIN — POLYETHYLENE GLYCOL 3350 17 G: 17 POWDER, FOR SOLUTION ORAL at 09:26

## 2024-12-18 RX ADMIN — PANTOPRAZOLE SODIUM 40 MG: 40 TABLET, DELAYED RELEASE ORAL at 09:36

## 2024-12-18 RX ADMIN — DOCUSATE SODIUM 100 MG: 100 CAPSULE, LIQUID FILLED ORAL at 09:27

## 2024-12-18 RX ADMIN — BISACODYL 5 MG: 5 TABLET, COATED ORAL at 09:33

## 2024-12-18 RX ADMIN — FERROUS SULFATE TAB 325 MG (65 MG ELEMENTAL FE) 325 MG: 325 (65 FE) TAB at 09:32

## 2024-12-18 RX ADMIN — HYDROXYCHLOROQUINE SULFATE 200 MG: 200 TABLET ORAL at 09:32

## 2024-12-18 RX ADMIN — MAGNESIUM HYDROXIDE 30 ML: 1200 LIQUID ORAL at 09:26

## 2024-12-18 RX ADMIN — SENNOSIDES AND DOCUSATE SODIUM 1 TABLET: 50; 8.6 TABLET ORAL at 09:26

## 2024-12-18 RX ADMIN — LISINOPRIL 40 MG: 40 TABLET ORAL at 09:32

## 2024-12-18 RX ADMIN — DOCUSATE SODIUM AND BENZOCAINE 1 ENEMA: 283; 20 LIQUID RECTAL at 09:33

## 2024-12-18 RX ADMIN — HYDROCHLOROTHIAZIDE 12.5 MG: 12.5 CAPSULE ORAL at 09:32

## 2024-12-18 RX ADMIN — SODIUM CHLORIDE, PRESERVATIVE FREE 10 ML: 5 INJECTION INTRAVENOUS at 09:33

## 2024-12-18 ASSESSMENT — PAIN - FUNCTIONAL ASSESSMENT: PAIN_FUNCTIONAL_ASSESSMENT: PREVENTS OR INTERFERES SOME ACTIVE ACTIVITIES AND ADLS

## 2024-12-18 ASSESSMENT — PAIN DESCRIPTION - DESCRIPTORS: DESCRIPTORS: SHARP

## 2024-12-18 ASSESSMENT — PAIN SCALES - GENERAL
PAINLEVEL_OUTOF10: 4
PAINLEVEL_OUTOF10: 5

## 2024-12-18 ASSESSMENT — PAIN DESCRIPTION - DIRECTION: RADIATING_TOWARDS: TOE

## 2024-12-18 ASSESSMENT — PAIN DESCRIPTION - PAIN TYPE: TYPE: CHRONIC PAIN

## 2024-12-18 ASSESSMENT — PAIN DESCRIPTION - ORIENTATION: ORIENTATION: RIGHT

## 2024-12-18 ASSESSMENT — PAIN DESCRIPTION - FREQUENCY: FREQUENCY: CONTINUOUS

## 2024-12-18 ASSESSMENT — PAIN DESCRIPTION - LOCATION: LOCATION: HIP

## 2024-12-18 NOTE — DISCHARGE INSTRUCTIONS
Back Surgery    Activity    No lifting, pushing, or pulling    Up as tolerated at least 3-4 times per day.    Up walking-helps to decrease the risk of blood clots    Wear surjit hose as directed per your physician     No driving until cleared by your physician    Back Brace or abdominal binder    If your physician prescribes a brace/abdominal binder, wear back brace at all times.     May remove when in bed or bathing    Follow all back precautions.    Incision Care    Keep back incision dry and intact. Apply clean dry dressing once a day.     May shower  if  no drainage from your incision-unless otherwise directed per your physician    No tub baths-no soaking of incision-no swimming     No heaving lifting of more than 5 lbs/or as directed per your physician       Diet    Increase Fluid/Water intake, eat foods high in fiber; fruits and vegetables to help to prevent constipation    Examples of foods high in fiber    Vegetables      All vegetables, especially asparagus, bean sprouts, broccoli, [place]  sprouts, cabbage, carrots, cauliflower, celery, corn, greens, green beans,  green  pepper, onions, peas, potatoes (with skin), snow peas, spinach,  squash,  sweet potatoes, tomatoes, zucchini      For maximum fiber intake, eat the peels of fruits and vegetables just be sure  to wash them well first.     Fruits    All fruits, especially apples, berries, grapefruits, mangoes, nectarines,  oranges, peaches, pears, dried fruits (figs, dates, prunes, raisins)      Choose raw fruits and vegetables over juice, cooked, or canned raw fruit  has  more fiber. Dried fruit is also a good source of fiber.       Some of the common symptoms of a surgical site infection are:    Symptoms in the area where the surgery took place:     Redness   Drainage   Pus   Pain   Swelling   Bad smell     Prevention of surgical site infection:    Make sure that your healthcare providers clean their hands before examining you - either with soap and water

## 2024-12-18 NOTE — PROGRESS NOTES
[facility]  INPATIENT PHYSICAL THERAPY  DAILY NOTE  [facility] ORTHOPEDICS 7K - 7K-09/009-A      Discharge Recommendations: Subacte/Skilled Nursing Facility  Equipment Recommendations: No (defer to next level  of  care)             Time In: 1035  Time Out: 1101  Timed Code Treatment Minutes: 26 Minutes  Minutes: 26          Date: 2024  Patient Name: Nancy Chavez,  Gender:  female        MRN: [id number]  : 1950  (74 y.o.)     Referring Practitioner: Lopez Bone PA-C  Diagnosis: Spinal stenosis of lumbar region with radiculopathy  Additional Pertinent Hx: Pt is s/p L2-L3, L3-L4, L4-L5, L5-S1 Lami completed by Dr. Perry on .     Prior Level of Function:  Lives With: Alone  Type of Home: House  Home Layout: Two level  Home Access: Stairs to enter with rails  Entrance Stairs - Number of Steps: 2-3 HIMANSHU with rail  Home Equipment: Walker - Rolling   Bathroom Shower/Tub: Walk-in shower, Tub/Shower unit (tub shower upstairs, walk in shower downstairs)  Bathroom Equipment: Shower chair    Prior Level of Assist for ADLs: Independent  Prior Level of Assist for Homemaking: Independent  Homemaking Responsibilities: Yes  Prior Level of Assist for Transfers: Independent  Active : Yes  Additional Comments: IND with functional tasks, no use of an AD.  Has the patient had two or more falls in the past year or any fall with injury in the past year?: No    Restrictions/Precautions:  Restrictions/Precautions: Fall Risk, Surgical Protocols, General Precautions  Required Braces or Orthoses  Spinal: Lumbar Corset (or abdominal binder (per ortho ))  Position Activity Restriction  Spinal Precautions: No Bending, No Lifting, No Twisting  Other Position/Activity Restrictions: monitor BP     SUBJECTIVE: RN ok'ed session Pt was A&Ox4. Pt was motivated to complete session. During session pt became nauseous and demo emesis throughout session. RN notified and gave zofran throughout session. PTA assisted 
 [facility]  INPATIENT PHYSICAL THERAPY  DAILY NOTE  [facility] ORTHOPEDICS 7K - 7K-09/009-A      Discharge Recommendations: Continue to assess pending progress, Subacte/Skilled Nursing Facility, and Patient would benefit from continued PT at discharge  Equipment Recommendations: No (defer to next level  of  care)             Time In: 1315  Time Out: 1326  Timed Code Treatment Minutes: 11 Minutes  Minutes: 11          Date: 2024  Patient Name: Nancy Chavez,  Gender:  female        MRN: [id number]  : 1950  (74 y.o.)     Referring Practitioner: Lopez Bone PA-C  Diagnosis: Spinal stenosis of lumbar region with radiculopathy  Additional Pertinent Hx: Pt is s/p L2-L3, L3-L4, L4-L5, L5-S1 Lami completed by Dr. Perry on .     Prior Level of Function:  Lives With: Alone  Type of Home: House  Home Layout: Two level  Home Access: Stairs to enter with rails  Entrance Stairs - Number of Steps: 2-3 HIMANSHU with rail  Home Equipment: Walker - Rolling   Bathroom Shower/Tub: Walk-in shower, Tub/Shower unit (tub shower upstairs, walk in shower downstairs)  Bathroom Equipment: Shower chair    Prior Level of Assist for ADLs: Independent  Prior Level of Assist for Homemaking: Independent  Homemaking Responsibilities: Yes  Prior Level of Assist for Transfers: Independent  Active : Yes  Additional Comments: IND with functional tasks, no use of an AD.  Has the patient had two or more falls in the past year or any fall with injury in the past year?: No    Restrictions/Precautions:  Restrictions/Precautions: Fall Risk, Surgical Protocols, General Precautions  Required Braces or Orthoses  Spinal: Lumbar Corset (or abdominal binder (per ortho ))  Position Activity Restriction  Spinal Precautions: No Bending, No Lifting, No Twisting  Other Position/Activity Restrictions: monitor BP     SUBJECTIVE: RN approves patient for PT and states that patient has been mobilizing with nsg staff. Per chart, ortho 
1903 Patient arrived to PACU. Patient arouses to voice and follows commands. Back dressing CDI. Hemovac drains x2 CDI, patent, and compressed. Ice packs applied. Constantino catheter draining and patent. Patient states pain 10/10. Verbal order for continuous pulse ox for 24 hours. Respirations even and unlabored. VSS.    1906 0.5 mg of Dilaudid given by Celeste BUTTERFIELD.    1915 Patient complains of pain 10/10. Patient medicated with 0.5 mg of Dilaudid at this time.    1920 Patient complains of pain 10/10. Patient medicated with 0.5 mg of Dilaudid at this time.    1925 Patient complains of pain 10/10. Patient medicated with 50 mcg of Fentanyl at this time.    1930 Patient complains of pain 10/10. Patient medicated with 50 mcg of Fentanyl at this time.    1940 Patient complains of pain 10/10. Patient medicated with 0.5 mg of Dilaudid at this time.    1945 Patient complains of pain 8/10. Patient medicated with 0.5 mg of Dilaudid at this time.    1955 Patient resting in bed with eyes closed, but arouses to voice. Patient states pain still 8/10, however, patient quickly drifts back to sleep appearing in minimal acute distress. Patient needs encouraged to take deep breaths. Patient not medicated at this time.    2000 Arterial line removed at this time.    2005 Patient meets criteria to discharge from PACU at this time.    2010 Report called to Rizwan VALDEZ RN.    2025 Patient transported to 7K09 in stable condition with all belongings.    2030 Patient's daughter, Jojo, called and updated at this time. Daughter states she dropped patient's belongings off in 7K09 before leaving the hospital. No other family members present here at this time to update.    
[facility]  STRZ ORTHOPEDICS 7K  Occupational Therapy  Daily Note    Discharge Recommendations: Subacute/skilled nursing facility  Equipment Recommendations: No         Time In: 0845  Time Out: 0915  Timed Code Treatment Minutes: 30 Minutes  Minutes: 30          Date: 2024  Patient Name: Nancy Chavez,   Gender: female      Room: [facility]09/009-A  MRN: [id number]  : 1950  (74 y.o.)  Referring Practitioner: Lopez Bone PA-C  Diagnosis: Spinal stenosis of lumbar region with radiculopathy  Additional Pertinent Hx: L2, L3, L4, L5, and S1 laminectomies bilateral.    Restrictions/Precautions:  Restrictions/Precautions: Fall Risk, Surgical Protocols, General Precautions  Required Braces or Orthoses  Spinal: Lumbar Corset (or abdominal binder (per ortho ))  Position Activity Restriction  Spinal Precautions: No Bending, No Lifting, No Twisting  Other Position/Activity Restrictions: monitor BP      Social/Functional History:  Lives With: Alone  Type of Home: House  Home Layout: Two level  Home Access: Stairs to enter with rails  Entrance Stairs - Number of Steps: 2-3 HIMANSHU with rail  Home Equipment: Walker - Rolling   Bathroom Shower/Tub: Walk-in shower, Tub/Shower unit (tub shower upstairs, walk in shower downstairs)  Bathroom Equipment: Shower chair       Prior Level of Assist for ADLs: Independent  Prior Level of Assist for Homemaking: Independent  Homemaking Responsibilities: Yes  Prior Level of Assist for Transfers: Independent  Has the patient had two or more falls in the past year or any fall with injury in the past year?: No    Active : Yes     Additional Comments: IND with functional tasks, no use of an AD.    SUBJECTIVE: Pt was motivated to start doing some activity as she had not had a BM in several days.  RN approved session.      PAIN: 5/10: Lower back and legs with R worse than LLLE    Vitals: Vitals not assessed per clinical judgement, see nursing flowsheet    COGNITION: 
[facility]  STR ORTHOPEDICS 7K  Occupational Therapy  Daily Note    Discharge Recommendations: Continue to assess pending progress and Inpatient Therapy Stay  Equipment Recommendations: No        Time In: 08  Time Out: 924  Timed Code Treatment Minutes: 38 Minutes  Minutes: 38          Date: 2024  Patient Name: Nancy Chavez,   Gender: female      Room: [address] [place] [address]  MRN: [id number]  : 1950  (74 y.o.)  Referring Practitioner: Lopez Bone PA-C  Diagnosis: Spinal stenosis of lumbar region with radiculopathy  Additional Pertinent Hx: L2, L3, L4, L5, and S1 laminectomies bilateral.    Restrictions/Precautions:  Restrictions/Precautions: Fall Risk  Required Braces or Orthoses  Spinal: Lumbar Corset  Position Activity Restriction  Spinal Precautions: No Bending, No Lifting, No Twisting  Other Position/Activity Restrictions: monitor BP     Social/Functional History:  Lives With: Alone  Type of Home: House  Home Layout: Two level  Home Access: Stairs to enter with rails  Entrance Stairs - Number of Steps: 2-3 HIMANSHU with rail  Home Equipment: Walker - Rolling   Bathroom Shower/Tub: Walk-in shower, Tub/Shower unit (tub shower upstairs, walk in shower downstairs)  Bathroom Equipment: Shower chair       Prior Level of Assist for ADLs: Independent  Prior Level of Assist for Homemaking: Independent  Homemaking Responsibilities: Yes  Prior Level of Assist for Transfers: Independent  Has the patient had two or more falls in the past year or any fall with injury in the past year?: No    Active : Yes          SUBJECTIVE: RN okayed OT session.  Pt. In room and agreeable to participate.  Pt. Reports yesterday was \"bad\"day.  Pt. Demo good progression this date.     PAIN: 10: low back    Vitals: Vitals not assessed per clinical judgement, see nursing flowsheet    COGNITION: WFL    ADL:   Grooming: Contact Guard Assistance and with set-up.  To stand at sink and complete oral care no LOB 
Department of Orthopedic Surgery  Spine Service   Progress Note        Subjective:    12/12/2024  Nancy is seen sitting in bed supine.  She complains of significant postoperative incisional pain.  She also complains of lower extremity radicular symptoms.  She does struggle with hypotension status post postoperative anemia due to acute blood loss in which she did have hemoglobin of 8.3 this a.m.  IV pain medications do bottom out her blood pressure.  We will work on pain control with oral pain medications and will add ferrous sulfate for assistance with restoration of her hemoglobin.  We will repeat labs in the a.m.  She is ready to be up with PT OT.  Inpatient rehab consultation was placed.    12/13/2024  Nancy is seen sitting up in chair with noted improvement with PT OT.  Pain is moderately controlled with oral pain meds.  Inpatient rehab was denied and patient is considering TCU at [facility].  She denies any BM.  She is reportedly eating and drinking well.  Hemoglobin is stable 8.4.    12/14/2024  Nancy is seen sitting up in chair with no significant complaints. She does feel uncomfortable with the LSO brace and which we will try a abdominal binder. Vitals stable, HGB stable in which oral iron added. She is eating and drinking well, with reports of flatus no BM. TCU in [place] has no beds, awaiting [facility] approval.     12/15/2024  Nancy was seen this a.m. with continued complaints of incisional pain.  She does complain of symptoms in her anterior tibialis and lateral aspect of her calf runs down into her foot.  She notes pain is worse right on the left.  The symptoms are notably little different from preoperative.  I did have a long discussion with her in regards to nerve healing as she is postoperative day 3 from a decompressive laminectomy.  We will have her continue to increase her activity with up and walking with the use of the abdominal binder and or the LSO brace.  We will have her continue to 
Department of Orthopedic Surgery  Spine Service   Progress Note        Subjective:    12/12/2024  Nancy is seen sitting in bed supine.  She complains of significant postoperative incisional pain.  She also complains of lower extremity radicular symptoms.  She does struggle with hypotension status post postoperative anemia due to acute blood loss in which she did have hemoglobin of 8.3 this a.m.  IV pain medications do bottom out her blood pressure.  We will work on pain control with oral pain medications and will add ferrous sulfate for assistance with restoration of her hemoglobin.  We will repeat labs in the a.m.  She is ready to be up with PT OT.  Inpatient rehab consultation was placed.    12/13/2024  Nancy is seen sitting up in chair with noted improvement with PT OT.  Pain is moderately controlled with oral pain meds.  Inpatient rehab was denied and patient is considering TCU at [facility].  She denies any BM.  She is reportedly eating and drinking well.  Hemoglobin is stable 8.4.    12/14/2024  Nancy is seen sitting up in chair with no significant complaints. She does feel uncomfortable with the LSO brace and which we will try a abdominal binder. Vitals stable, HGB stable in which oral iron added. She is eating and drinking well, with reports of flatus no BM. TCU in [place] has no beds, awaiting [facility] approval.     Vitals  VITALS:  /61   Pulse 79   Temp 97.6 °F (36.4 °C) (Oral)   Resp 20   Ht 1.549 m (5' 1\")   Wt 63 kg (138 lb 12.8 oz)   SpO2 96%   BMI 26.23 kg/m²   24HR INTAKE/OUTPUT:    Intake/Output Summary (Last 24 hours) at 12/14/2024 0756  Last data filed at 12/14/2024 0426  Gross per 24 hour   Intake 755 ml   Output 60 ml   Net 695 ml     URINARY CATHETER OUTPUT (Constantino):  [REMOVED] Urinary Catheter 12/11/24 Constantino-Temperature;Constantino-Output (mL): 350 mL  DRAIN/TUBE OUTPUT:  Closed/Suction Drain Inferior;Left Back Accordion-Output (ml): 0 ml  Closed/Suction Drain Inferior;Right Back 
Department of Orthopedic Surgery  Spine Service   Progress Note        Subjective:    12/12/2024  Nancy is seen sitting in bed supine.  She complains of significant postoperative incisional pain.  She also complains of lower extremity radicular symptoms.  She does struggle with hypotension status post postoperative anemia due to acute blood loss in which she did have hemoglobin of 8.3 this a.m.  IV pain medications do bottom out her blood pressure.  We will work on pain control with oral pain medications and will add ferrous sulfate for assistance with restoration of her hemoglobin.  We will repeat labs in the a.m.  She is ready to be up with PT OT.  Inpatient rehab consultation was placed.    12/13/2024  Nancy is seen sitting up in chair with noted improvement with PT OT.  Pain is moderately controlled with oral pain meds.  Inpatient rehab was denied and patient is considering TCU at [facility].  She denies any BM.  She is reportedly eating and drinking well.  Hemoglobin is stable 8.4.    12/14/2024  Nancy is seen sitting up in chair with no significant complaints. She does feel uncomfortable with the LSO brace and which we will try a abdominal binder. Vitals stable, HGB stable in which oral iron added. She is eating and drinking well, with reports of flatus no BM. TCU in [place] has no beds, awaiting [facility] approval.     12/15/2024  Nancy was seen this a.m. with continued complaints of incisional pain.  She does complain of symptoms in her anterior tibialis and lateral aspect of her calf runs down into her foot.  She notes pain is worse right on the left.  The symptoms are notably little different from preoperative.  I did have a long discussion with her in regards to nerve healing as she is postoperative day 3 from a decompressive laminectomy.  We will have her continue to increase her activity with up and walking with the use of the abdominal binder and or the LSO brace.  We will have her continue to 
Department of Orthopedic Surgery  Spine Service   Progress Note        Subjective:    12/12/2024  Nancy is seen sitting in bed supine.  She complains of significant postoperative incisional pain.  She also complains of lower extremity radicular symptoms.  She does struggle with hypotension status post postoperative anemia due to acute blood loss in which she did have hemoglobin of 8.3 this a.m.  IV pain medications do bottom out her blood pressure.  We will work on pain control with oral pain medications and will add ferrous sulfate for assistance with restoration of her hemoglobin.  We will repeat labs in the a.m.  She is ready to be up with PT OT.  Inpatient rehab consultation was placed.    12/132024  Nancy is seen sitting up in chair with noted improvement with PT OT.  Pain is moderately controlled with oral pain meds.  Inpatient rehab was denied and patient is considering TCU at [facility].  She denies any BM.  She is reportedly eating and drinking well.  Hemoglobin is stable 8.4.    Vitals  VITALS:  /69   Pulse 87   Temp 98.6 °F (37 °C) (Oral)   Resp 19   Ht 1.549 m (5' 1\")   Wt 63 kg (138 lb 12.8 oz)   SpO2 97%   BMI 26.23 kg/m²   24HR INTAKE/OUTPUT:    Intake/Output Summary (Last 24 hours) at 12/13/2024 0925  Last data filed at 12/13/2024 0859  Gross per 24 hour   Intake 635 ml   Output 1110 ml   Net -475 ml     URINARY CATHETER OUTPUT (Constantino):  [REMOVED] Urinary Catheter 12/11/24 Constantino-Temperature;Constantino-Output (mL): 350 mL  DRAIN/TUBE OUTPUT:  Closed/Suction Drain Inferior;Left Back Accordion-Output (ml): 0 ml  Closed/Suction Drain Inferior;Right Back Accordion-Output (ml): 30 ml      PHYSICAL EXAM:    Orientation:  alert and oriented to person, place and time    Incision:  dressing in place, clean, dry, intact      Lower Extremity Motor :  quadriceps, extensor hallucis longus, dorsiflexion, plantarflexion 5/5 bilaterally  Lower Extremity Sensory:  Intact L1-S1    Flatus:  
Department of Orthopedic Surgery  Spine Service   Progress Note        Subjective:    Nancy is seen sitting in bed supine.  She complains of significant postoperative incisional pain.  She also complains of lower extremity radicular symptoms.  She does struggle with hypotension status post postoperative anemia due to acute blood loss in which she did have hemoglobin of 8.3 this a.m.  IV pain medications do bottom out her blood pressure.  We will work on pain control with oral pain medications and will add ferrous sulfate for assistance with restoration of her hemoglobin.  We will repeat labs in the a.m.  She is ready to be up with PT OT.  Inpatient rehab consultation was placed.    Vitals  VITALS:  BP 96/60   Pulse 88   Temp 98.2 °F (36.8 °C) (Oral)   Resp 16   Ht 1.549 m (5' 1\")   Wt 63 kg (138 lb 12.8 oz)   SpO2 99%   BMI 26.23 kg/m²   24HR INTAKE/OUTPUT:    Intake/Output Summary (Last 24 hours) at 12/12/2024 0700  Last data filed at 12/12/2024 0344  Gross per 24 hour   Intake 2575 ml   Output 1100 ml   Net 1475 ml     URINARY CATHETER OUTPUT (Constantino):  Urinary Catheter 12/11/24 Constantino-Temperature;Constantino-Output (mL): 350 mL  DRAIN/TUBE OUTPUT:  Closed/Suction Drain Inferior;Left Back Accordion-Output (ml): 20 ml  Closed/Suction Drain Inferior;Right Back Accordion-Output (ml): 20 ml      PHYSICAL EXAM:    Orientation:  alert and oriented to person, place and time    Incision:  dressing in place, clean, dry, intact      Lower Extremity Motor :  quadriceps, extensor hallucis longus, dorsiflexion, plantarflexion 5/5 bilaterally  Lower Extremity Sensory:  Intact L1-S1    Flatus:  negative    ABNORMAL EXAM FINDINGS:  none    LABS:    HgB:    Lab Results   Component Value Date/Time    HGB 8.9 12/12/2024 04:16 AM     CBC with Differential:    Lab Results   Component Value Date/Time    WBC 10.6 12/12/2024 04:16 AM    RBC 2.92 12/12/2024 04:16 AM    HGB 8.9 12/12/2024 04:16 AM    HCT 27.6 12/12/2024 04:16 AM     
[facility]  OCCUPATIONAL THERAPY MISSED TREATMENT NOTE  [facility] ORTHOPEDICS 7K  7K-09/009-A      Date: 2024  Patient Name: Nancy Chavez        CSN: [id number]   : 1950  (74 y.o.)  Gender: female   Referring Practitioner: Lopez Bone PA-C  Diagnosis: spinal stenosis, lumbar region, with neurogenic claudication         REASON FOR MISSED TREATMENT:  Attempt 1: patient sitting EOB eating breakfast, requesting for this writer to return.  Attempt 2: PTA exited room and informed this writer that patient experienced emesis episode during session, patient in bed at this time.  Will attempt later today as time allows.                  
Follow all instructions given by your physician  Do not eat or drink anything after midnight prior to surgery(includes water, chewing gum, mints and ice chips)  Sips of water am of surgery with allowed medications  May brush teeth   Do not smoke or chew tobacco, drink alcoholic beverages or use any illicit drugs for 24 hours prior to surgery  Bring insurance info and photo ID  Bring pertinent paperwork with you from Doctor or surgeons's office  Wear clean comfortable, loose-fitting clothing  No make-up, nail polish, jewelry, piercings, or contact lenses to be worn day of surgery  No glue on dentures morning of surgery; you will be asked to remove them for surgery. Case for glasses.  Shower the night before and the morning of surgery with cleansing soap provided or a liquid antibacterial soap, dry with new fresh clean towel after each shower, no lotions, creams or powder.  Clean sheets and pillowcase on bed night before surgery  Bring medications in original bottles, Bring rescue inhalers with you  Bring CPAP/BIPAP machine if you have one ( you may be charged if one is needed in recovery room )  NO    Do you have a DNR?   Please Bring Healthcare Directive or Healthcare Power of  in so we can scan it into your chart.    Our pharmacy has a Meds to Beds program where they will deliver any new prescriptions you may have to your room before you leave. Our Pharmacy will clear it through your insurance; for example (same co pay). This enables you to take your new RX as soon as you need when you get home and avoids stop/wait delays on the way home.  Please have a form of payment with you and have someone designated as your Pharmacy contact with their phone # as you may not feel well or still be under the influence of anesthesia.    Please refer to the SSI-Surgical Site Infection Flyer you hopefully received in the mail-together we can prevent infections; signs and symptoms reviewed.  When discharged be sure you 
[facility]  INPATIENT OCCUPATIONAL THERAPY  [facility] ORTHOPEDICS 7K  EVALUATION      Discharge Recommendations: IP Rehab (Pt requesting IPR)  Equipment Recommendations: No        Time In: 0805  Time Out: 0854  Timed Code Treatment Minutes: 38 Minutes  Minutes: 49          Date: 2024  Patient Name: Nancy Chavez,   Gender: female      MRN: [id number]  : 1950  (74 y.o.)  Referring Practitioner: Lopez Bone PA-C  Diagnosis: Spinal stenosis of lumbar region with radiculopathy  Additional Pertinent Hx: L2, L3, L4, L5, and S1 laminectomies bilateral.    Restrictions/Precautions:  Restrictions/Precautions: Fall Risk  Required Braces or Orthoses  Spinal: Lumbar Corset  Position Activity Restriction  Spinal Precautions: No Bending, No Lifting, No Twisting  Other Position/Activity Restrictions: monitor BP    Subjective  Chart Reviewed: Yes, Orders, Progress Notes, History and Physical    Subjective: Nurse approved OT eval. Pt in bed on OT arrival, pleasant but visibly in pain. Pt agreeable to trial repositioning to aide in pain management. BP low overnight.    Pain: 10/10    Vitals: Blood Pressure: 98/50 in supine; 103/81 sitting EOB; 106/65 after transfer to recliner In sitting    Social/Functional History:  Lives With: Alone (Pt reports her son has moved in with her but he is getting ready to move out and also works and rarely home. Nobody in the house who can assist per pt.)  Type of Home: House  Home Layout: Two level, Bed/Bath upstairs  Home Access: Stairs to enter with rails  Entrance Stairs - Number of Steps: 2-3 HIMANSHU with rail  Home Equipment: Walker - Rolling   Bathroom Shower/Tub: Walk-in shower, Tub/Shower unit (tub shower upstairs, walk in shower downstairs)  Bathroom Equipment: Shower chair       Prior Level of Assist for ADLs: Independent  Prior Level of Assist for Homemaking: Independent  Homemaking Responsibilities: Yes  Prior Level of Assist for Transfers: Independent  Has the 
[facility]  STR ORTHOPEDICS 7K  Occupational Therapy  Daily Note    Discharge Recommendations: Subacute/skilled nursing facility  Equipment Recommendations: No        Time In: 0818  Time Out: 0856  Timed Code Treatment Minutes: 38 Minutes  Minutes: 38          Date: 2024  Patient Name: Nancy Chavez,   Gender: female      Room: [facility]/009-A  MRN: [id number]  : 1950  (74 y.o.)  Referring Practitioner: Lopez Bone PA-C  Diagnosis: Spinal stenosis of lumbar region with radiculopathy  Additional Pertinent Hx: L2, L3, L4, L5, and S1 laminectomies bilateral.    Restrictions/Precautions:  Restrictions/Precautions: Fall Risk, Surgical Protocols, General Precautions  Required Braces or Orthoses  Spinal: Lumbar Corset (or abdominal binder (per ortho ))  Position Activity Restriction  Spinal Precautions: No Bending, No Lifting, No Twisting  Other Position/Activity Restrictions: monitor BP     Social/Functional History:  Lives With: Alone  Type of Home: House  Home Layout: Two level  Home Access: Stairs to enter with rails  Entrance Stairs - Number of Steps: 2-3 HIMANSHU with rail  Home Equipment: Walker - Rolling   Bathroom Shower/Tub: Walk-in shower, Tub/Shower unit (tub shower upstairs, walk in shower downstairs)  Bathroom Equipment: Shower chair       Prior Level of Assist for ADLs: Independent  Prior Level of Assist for Homemaking: Independent  Homemaking Responsibilities: Yes  Prior Level of Assist for Transfers: Independent  Has the patient had two or more falls in the past year or any fall with injury in the past year?: No    Active : Yes     Additional Comments: IND with functional tasks, no use of an AD.    SUBJECTIVE: RN approved OT session. PCT in room to assist patient OOB and agreeable to therapist taking over. Pleasant and agreeable. States continued pain down right leg into foot    PAIN: 6/10: R leg into foot, nerve pains     Vitals: Nurse checked vitals prior to 
Left message for [facility] - requesting for medical clearance pre-op to be faxed to PAT.  Await response.  
Left message for [facility] records - still needing clearance pre-op.  Await call back.  
Left message for JIM Donovan of Dr. Perry - requesting for pre-op medical clearance.  Await response.  
Pt admitted to hospitals and oriented to unit. SCD sleeves applied. Nares swabbed. Pt verbalized permission for first name, last initial and physicians name on white board. SDS board and discharge criteria explained, pt and family verbalized understanding. Pt denies thoughts of harming self or others. Call light in reach. Family at the bedside. Hope [phone]  
Pt is refusing to turn. Pt  educated on possible risk of skin breakdown/pressure ulcers. Patient states she is in too much pain to reposition. Pain medication given PRN as appropriate.     
[facility]  PHYSICAL THERAPY MISSED TREATMENT NOTE  [facility]    Date: 2024  Patient Name: Nancy Chavez        MRN: [id number]   : 1950  (74 y.o.)  Gender: female                REASON FOR MISSED TREATMENT:  Patient refused treatment.  Pt reports she just got into bed with nursing staff, just had a pain med and declines getting out of bed \"anymore today.\" Pt reports she wants to rest. This PT asked if we can try this afternoon with education on mobility and POC, pt somewhat agreeable. Will check back as able.        
Spiritual Health History and Assessment/Progress Note  [facility]    (P) Initial Encounter,  ,  ,      Name: Nancy Chavez MRN: [id number]    Age: 74 y.o.     Sex: female   Language: English   Sikhism: Non-Restorationist   Spinal stenosis of lumbar region with radiculopathy     Date: 12/13/2024            Total Time Calculated: (P) 15 min              Spiritual Assessment began in [facility]        Referral/Consult From: (P) Rounding   Encounter Overview/Reason: (P) Initial Encounter  Service Provided For: (P) Patient    [place], Belief, Meaning:   Patient identifies as spiritual, is connected with a adilene tradition or spiritual practice, and has beliefs or practices that help with coping during difficult times  Family/Friends No family/friends present      Importance and Influence:  Patient has spiritual/personal beliefs that influence decisions regarding their health  Family/Friends No family/friends present    Community:  Patient is connected with a spiritual community, feels well-supported. Support system includes: [place] Community and Friends, and Other: The patient shared how her Mu-ism has changed affiliations from  to [facility] to now being independent. The patient was proud of her Mu-ism but sad she was too ill to do her old volunteering.   Family/Friends No family/friends present    Assessment and Plan of Care:     Patient Interventions include: Facilitated expression of thoughts and feelings, Explored spiritual coping/struggle/distress, Engaged in theological reflection, Affirmed coping skills/support systems, Provided sacramental/[facility] ritual, and Facilitated life review and/ or legacy  Family/Friends Interventions include: No family/friends present    Patient Plan of Care: Spiritual Care available upon further referral  Family/Friends Plan of Care: No family/friends present    Electronically signed by Chaplain Kelton on 12/13/2024 at 3:02 PM    
Mobility:  Supine to Sit: Stand By Assistance, X 1, with head of bed raised, with rail    Transfers:  Sit to Stand: Stand By Assistance, X 1  Stand to Sit:Stand By Assistance, X 1    Ambulation:  Stand By Assistance, Contact Guard Assistance, X 1  Distance: 50'x2 with seated rest break in between   Surface: Level Tile  Device: Rolling Walker  Gait Deviations: Slow Sherice, Decreased Step Length Bilaterally, Decreased Gait Speed, Decreased Heel Strike Bilaterally, Decreased Terminal Knee Extension, and Increased reliance on assistive device     Stairs:  Not Tested    Balance:  Static Sitting Balance:  Stand By Assistance  Static Standing Balance: Stand By Assistance  Dynamic Standing Balance: Contact Guard Assistance    Exercise:  Patient was guided in 1 set(s) 10 reps of exercises to both lower extremities: Glut sets, Seated marches, Seated hamstring curls, Seated heel/toe raises, Long arc quads, Seated isometric hip adduction, Seated abduction/adduction. Exercises were completed for increased independence with functional mobility.    Functional Outcome Measures:  Not completed  Modified [place] Scale:  Not Applicable    ASSESSMENT:  Assessment: Patient progressing toward established goals.  Activity Tolerance:  Patient tolerance of  treatment:Good.  Plan: Current Treatment Recommendations: Strengthening, Balance training, Gait training, Functional mobility training, Stair training, Neuromuscular re-education, Transfer training, Endurance training, Equipment evaluation, education, & procurement, Patient/Caregiver education & training, Safety education & training, Therapeutic activities, Home exercise program  General Plan:  (6x O)    Education:  Learners: Patient  Patient Education: Plan of Care, Precautions/Restrictions, Bed Mobility, Transfers, Gait, Verbal Exercise Instruction,  - Patient Verbalized Understanding, - Patient Requires Continued Education    Goals:  Patient Goals : rehab and then home  Short Term 
utilize her pain medications as needed.  She reports she is passing flatus without any BM.  We will add additional stool softeners for assistance for BM.  We are awaiting [facility] rehab at discharge.    Vitals  VITALS:  BP (!) 110/55   Pulse 96   Temp 98.1 °F (36.7 °C) (Oral)   Resp 17   Ht 1.549 m (5' 1\")   Wt 63 kg (138 lb 12.8 oz)   SpO2 99%   BMI 26.23 kg/m²   24HR INTAKE/OUTPUT:    Intake/Output Summary (Last 24 hours) at 12/15/2024 0842  Last data filed at 12/15/2024 0413  Gross per 24 hour   Intake 1050 ml   Output --   Net 1050 ml     URINARY CATHETER OUTPUT (Constantino):  [REMOVED] Urinary Catheter 12/11/24 Constantino-Temperature;Constantino-Output (mL): 350 mL  DRAIN/TUBE OUTPUT:  [REMOVED] Closed/Suction Drain Inferior;Left Back Accordion-Output (ml): 0 ml  [REMOVED] Closed/Suction Drain Inferior;Right Back Accordion-Output (ml): 10 ml      PHYSICAL EXAM:    Orientation:  alert and oriented to person, place and time    Incision:  dressing in place, clean, dry, intact      Lower Extremity Motor :  quadriceps, extensor hallucis longus, dorsiflexion, plantarflexion 5/5 bilaterally  Lower Extremity Sensory:  Intact L1-S1    Flatus:  negative    ABNORMAL EXAM FINDINGS:  none    LABS:    HgB:    Lab Results   Component Value Date/Time    HGB 7.4 12/15/2024 04:07 AM     CBC with Differential:    Lab Results   Component Value Date/Time    WBC 6.7 12/15/2024 04:07 AM    RBC 2.43 12/15/2024 04:07 AM    HGB 7.4 12/15/2024 04:07 AM    HCT 22.7 12/15/2024 04:07 AM     12/15/2024 04:07 AM    MCV 93.4 12/15/2024 04:07 AM    MCH 30.5 12/15/2024 04:07 AM    MCHC 32.6 12/15/2024 04:07 AM    NRBC 0 12/15/2024 04:07 AM    MONOPCT 14.3 12/15/2024 04:07 AM    MONOSABS 1.0 12/15/2024 04:07 AM    LYMPHSABS 1.5 12/15/2024 04:07 AM    EOSABS 0.1 12/15/2024 04:07 AM    BASOSABS 0.0 12/15/2024 04:07 AM       ASSESSMENT AND PLAN:    Post operative day 3    1:  Monitor labs and drain output. Remove surgical drains and 
utilize her pain medications as needed.  She reports she is passing flatus without any BM.  We will add additional stool softeners for assistance for BM.  We are awaiting [facility] rehab at discharge.    12/16/24  Nancy was resting in bed. Doing well. Pain improved from yesterday. Awaiting placement. Hgb is improved today 7.7, asymptomatic. Will continue to monitor if still in the hospital.     Vitals  VITALS:  BP (!) 109/58   Pulse 87   Temp 98 °F (36.7 °C) (Oral)   Resp 18   Ht 1.549 m (5' 1\")   Wt 63 kg (138 lb 12.8 oz)   SpO2 99%   BMI 26.23 kg/m²   24HR INTAKE/OUTPUT:    Intake/Output Summary (Last 24 hours) at 12/16/2024 0709  Last data filed at 12/16/2024 0315  Gross per 24 hour   Intake 830 ml   Output --   Net 830 ml     URINARY CATHETER OUTPUT (Constantino):  [REMOVED] Urinary Catheter 12/11/24 Constantino-Temperature;Constantino-Output (mL): 350 mL  DRAIN/TUBE OUTPUT:  [REMOVED] Closed/Suction Drain Inferior;Left Back Accordion-Output (ml): 0 ml  [REMOVED] Closed/Suction Drain Inferior;Right Back Accordion-Output (ml): 10 ml      PHYSICAL EXAM:    Orientation:  alert and oriented to person, place and time    Incision:  dressing in place, clean, dry, intact      Lower Extremity Motor :  quadriceps, extensor hallucis longus, dorsiflexion, plantarflexion 5/5 bilaterally  Lower Extremity Sensory:  Intact L1-S1    Flatus:  negative    ABNORMAL EXAM FINDINGS:  none    LABS:    HgB:    Lab Results   Component Value Date/Time    HGB 7.7 12/16/2024 06:24 AM     CBC with Differential:    Lab Results   Component Value Date/Time    WBC 6.7 12/15/2024 04:07 AM    RBC 2.43 12/15/2024 04:07 AM    HGB 7.7 12/16/2024 06:24 AM    HCT 24.0 12/16/2024 06:24 AM     12/15/2024 04:07 AM    MCV 93.4 12/15/2024 04:07 AM    MCH 30.5 12/15/2024 04:07 AM    MCHC 32.6 12/15/2024 04:07 AM    NRBC 0 12/15/2024 04:07 AM    MONOPCT 14.3 12/15/2024 04:07 AM    MONOSABS 1.0 12/15/2024 04:07 AM    LYMPHSABS 1.5 12/15/2024 04:07 AM 
Term Goal 3: Pt will complete LB dressing/bathing with minimal A utilizing LHAE as needed and with no additional cues for maintaining spinal precautions to improve indep with ADL routines.  Short Term Goal 4: Pt will retrieve x5 items from various surface heights with SBA and no additional cues for maintaining spinal precautions to improve indep with ADL prep and clean up.    Following session, patient left in safe position with all fall risk precautions in place.        
technique of exercises with good demo. Pt provided with HEP and educated om frequency of completion.     Functional Outcome Measures:  Tinetti Balance    Sitting Balance: Steady, safe  Arises: Able, uses arms to help  Attempts to Arise: Able, requires more than one attempt  Immediate Standing Balance (First 5 Seconds): Steady but uses walker or other support  Standing Balance: Steady but wide stance, uses cane or other support  Nudged: Staggers, grabs, catches self  Eyes Closed: Steady  Turned 360 Degrees: Steadiness: Unsteady (grabs, staggers)  Turned 360 Degrees: Continuity of Steps: Continuous  Sitting Down: Uses arms or not a smooth motion  Balance Score: 9/16 Initiation of Gait: No hesitancy  Step Height: R Swing Foot: Right foot complete clears floor  Step Length: R Swing Foot: Does not pass left stance foot with step  Step Height: L Swing Foot: Left foot complete clears floor  Step Length: L Swing Foot: Does not pass right stance foot with step  Step Symmetry: Right and left step length not equal (estimate)  Step Continuity: Steps appear continuous  Path: Mild/moderate deviation or uses walking aid  Trunk: Marked sway or uses walking aid  Walking Time: Heels apart  Gait Score: 5/12     Current Score: 14 / 28 (Date: 12/16/2024)    Interpretation of Score: Tinetti is  into a gait score and balance score. The higher the patient's score, the more independent/lower fall risk. A total score of 24 or more indicates low fall risk, 19-23 is moderate fall risk, and 18 or less is indicative of high fall risk. , [facility] (6 CLICK) BASIC MOBILITY     AM-PAC Inpatient Mobility without Stair Climbing Raw Score : 17  AM-PAC Inpatient without Stair Climbing T-Scale Score : 48.47     Modified [place] Scale:  Not Applicable    ASSESSMENT:  Assessment: Patient progressing toward established goals.  Activity Tolerance:  Patient tolerance of  treatment:Good.  Plan: Current Treatment Recommendations: Strengthening, Balance 
Prevention Strategies, Equipment, Precautions  Education Method: Verbal  Education Outcome: Verbalized understanding, Demonstrated understanding, Continued education needed       Plan:  Current Treatment Recommendations: Strengthening, Balance training, Gait training, Functional mobility training, Stair training, Neuromuscular re-education, Transfer training, Endurance training, Equipment evaluation, education, & procurement, Patient/Caregiver education & training, Safety education & training, Therapeutic activities, Home exercise program  General Plan:  (6x O)    Goals:  Patient Goals : rehab and then home  Short Term Goals  Time Frame for Short Term Goals: by discharge  Short Term Goal 1: Pt will demo bed mobility tasks  with IND with log roll technique to progress with mobility.  Short Term Goal 2: Pt will demo sit to/from stand transfers with RW with S to progress with mobility.  Short Term Goal 3: Pt will amb for 40 feet with S with RW to progress with mobility.  Short Term Goal 4: Pt will tolerate 10-20 reps of  ther ex to increase  overall mobility.  Long Term Goals  Time Frame for Long Term Goals : NA due to short ELOS    Following session, patient left in safe position with all fall risk precautions in place. Pt in bed following session, all needs and call light in reach, alarm on.

## 2024-12-18 NOTE — CARE COORDINATION
12/18/24, 9:57 AM EST    DISCHARGE PLANNING EVALUATION    [facility] is approved for [facility].   Confirmed plan with patient.  Anticipate family transport, will not qualify for ambulance, ambulette would be private pay.

## 2024-12-18 NOTE — DISCHARGE INSTR - COC
Good    Recommended Labs or Other Treatments After Discharge: None    Physician Certification: I certify the above information and transfer of Nancy Chavez  is necessary for the continuing treatment of the diagnosis listed and that she requires Skilled Nursing Facility for less 30 days.     Update Admission H&P: No change in H&P    PHYSICIAN SIGNATURE:  Electronically signed by Shane Rich PA-C on 12/18/24 at 1:16 PM EST

## 2024-12-18 NOTE — FLOWSHEET NOTE
Transfer instructions reviewed with pt and daughter.  Daughter is taking pt to [facility].  [facility] notified that pt is coming with daughter.  Report called to nursing staff.  Iv removed, no complications, refer to flowsheet.  Pt taken down in wheelchair to daughter's car.  Transfer packet given to daughter.  No further questions.

## 2024-12-18 NOTE — CARE COORDINATION
12/18/24, 1:57 PM EST    DISCHARGE PLANNING EVALUATION    Planning [facility] today, daughter will transport.  [facility] faxed.     12/18/24, 1:57 PM EST    Patient goals/plan/ treatment preferences discussed by  and .  Patient goals/plan/ treatment preferences reviewed with patient/ family.  Patient/ family verbalize understanding of discharge plan and are in agreement with goal/plan/treatment preferences.  Understanding was demonstrated using the teach back method.  AVS provided by RN at time of discharge, which includes all necessary medical information pertaining to the patients current course of illness, treatment, post-discharge goals of care, and treatment preferences.     Services At/After Discharge: Skilled Nursing Facility (SNF)

## 2024-12-18 NOTE — PLAN OF CARE
Problem: Discharge Planning  Goal: Discharge to home or other facility with appropriate resources  12/12/2024 1235 by Valerie Lisa LPN  Outcome: Progressing  Flowsheets (Taken 12/12/2024 1235)  Discharge to home or other facility with appropriate resources: Identify barriers to discharge with patient and caregiver     Problem: Pain  Goal: Verbalizes/displays adequate comfort level or baseline comfort level  Outcome: Progressing  Flowsheets (Taken 12/12/2024 1235)  Verbalizes/displays adequate comfort level or baseline comfort level: Encourage patient to monitor pain and request assistance     Problem: Safety - Adult  Goal: Free from fall injury  Outcome: Progressing  Flowsheets (Taken 12/12/2024 1235)  Free From Fall Injury: Instruct family/caregiver on patient safety     Problem: ABCDS Injury Assessment  Goal: Absence of physical injury  Outcome: Progressing  Flowsheets (Taken 12/12/2024 1235)  Absence of Physical Injury: Implement safety measures based on patient assessment   Care plan reviewed with patient.  Patient verbalized understanding of the plan of care and contribute to goal setting.     
  Problem: Discharge Planning  Goal: Discharge to home or other facility with appropriate resources  12/13/2024 0115 by Reny Fowelr RN  Outcome: Progressing  Flowsheets (Taken 12/13/2024 0115)  Discharge to home or other facility with appropriate resources:   Identify barriers to discharge with patient and caregiver   Arrange for needed discharge resources and transportation as appropriate   Identify discharge learning needs (meds, wound care, etc)  12/12/2024 1235 by Valerie Lisa LPN  Outcome: Progressing  Flowsheets (Taken 12/12/2024 1235)  Discharge to home or other facility with appropriate resources: Identify barriers to discharge with patient and caregiver     Problem: Pain  Goal: Verbalizes/displays adequate comfort level or baseline comfort level  12/13/2024 0115 by Reny Fowler RN  Outcome: Progressing  Flowsheets (Taken 12/13/2024 0115)  Verbalizes/displays adequate comfort level or baseline comfort level:   Encourage patient to monitor pain and request assistance   Assess pain using appropriate pain scale   Administer analgesics based on type and severity of pain and evaluate response   Implement non-pharmacological measures as appropriate and evaluate response  12/12/2024 1235 by Valerie Lisa LPN  Outcome: Progressing  Flowsheets (Taken 12/12/2024 1235)  Verbalizes/displays adequate comfort level or baseline comfort level: Encourage patient to monitor pain and request assistance     Problem: Safety - Adult  Goal: Free from fall injury  12/13/2024 0115 by Reny Fowler RN  Outcome: Progressing  Flowsheets (Taken 12/13/2024 0115)  Free From Fall Injury: Instruct family/caregiver on patient safety  12/12/2024 1235 by Vaelrie Lisa LPN  Outcome: Progressing  Flowsheets (Taken 12/12/2024 1235)  Free From Fall Injury: Instruct family/caregiver on patient safety     Problem: ABCDS Injury Assessment  Goal: Absence of physical injury  12/13/2024 0115 by Reny Fowler 
  Problem: Discharge Planning  Goal: Discharge to home or other facility with appropriate resources  12/14/2024 2108 by Renetta Armas RN  Outcome: Progressing  Flowsheets (Taken 12/14/2024 2108)  Discharge to home or other facility with appropriate resources:   Identify barriers to discharge with patient and caregiver   Arrange for needed discharge resources and transportation as appropriate   Identify discharge learning needs (meds, wound care, etc)     Problem: Pain  Goal: Verbalizes/displays adequate comfort level or baseline comfort level  12/14/2024 2108 by Renetta Armas RN  Outcome: Progressing  Flowsheets (Taken 12/14/2024 2108)  Verbalizes/displays adequate comfort level or baseline comfort level:   Encourage patient to monitor pain and request assistance   Assess pain using appropriate pain scale   Administer analgesics based on type and severity of pain and evaluate response   Implement non-pharmacological measures as appropriate and evaluate response     Problem: Safety - Adult  Goal: Free from fall injury  12/14/2024 2108 by Renetta Armas RN  Outcome: Progressing  Flowsheets (Taken 12/14/2024 2108)  Free From Fall Injury: Instruct family/caregiver on patient safety     Problem: ABCDS Injury Assessment  Goal: Absence of physical injury  12/14/2024 2108 by Renetta Armas RN  Outcome: Progressing  Flowsheets (Taken 12/14/2024 2108)  Absence of Physical Injury: Implement safety measures based on patient assessment     Problem: Respiratory - Adult  Goal: Achieves optimal ventilation and oxygenation  12/14/2024 2108 by Renetta Armas RN  Outcome: Progressing  Flowsheets (Taken 12/14/2024 2108)  Achieves optimal ventilation and oxygenation:   Assess for changes in respiratory status   Assess for changes in mentation and behavior   Position to facilitate oxygenation and minimize respiratory effort   Assess and instruct to report shortness of breath or any respiratory difficulty   Encourage 
  Problem: Discharge Planning  Goal: Discharge to home or other facility with appropriate resources  12/16/2024 1158 by Mariella Olivo, RN  Outcome: Progressing  Flowsheets (Taken 12/16/2024 1158)  Discharge to home or other facility with appropriate resources:   Identify barriers to discharge with patient and caregiver   Identify discharge learning needs (meds, wound care, etc)   Refer to discharge planning if patient needs post-hospital services based on physician order or complex needs related to functional status, cognitive ability or social support system   Arrange for needed discharge resources and transportation as appropriate  12/15/2024 2232 by Wendie Wright, RN  Outcome: Progressing  Flowsheets (Taken 12/14/2024 2108 by Renetta Armas, RN)  Discharge to home or other facility with appropriate resources:   Identify barriers to discharge with patient and caregiver   Arrange for needed discharge resources and transportation as appropriate   Identify discharge learning needs (meds, wound care, etc)     Problem: Pain  Goal: Verbalizes/displays adequate comfort level or baseline comfort level  12/16/2024 1158 by Mariella Olivo RN  Outcome: Progressing  Flowsheets (Taken 12/16/2024 1158)  Verbalizes/displays adequate comfort level or baseline comfort level:   Encourage patient to monitor pain and request assistance   Administer analgesics based on type and severity of pain and evaluate response   Consider cultural and social influences on pain and pain management   Assess pain using appropriate pain scale   Implement non-pharmacological measures as appropriate and evaluate response   Notify Licensed Independent Practitioner if interventions unsuccessful or patient reports new pain  12/15/2024 2232 by Wendie Wright, RN  Outcome: Progressing  Flowsheets (Taken 12/15/2024 2232)  Verbalizes/displays adequate comfort level or baseline comfort level:   Encourage patient to monitor pain and request 
  Problem: Discharge Planning  Goal: Discharge to home or other facility with appropriate resources  12/16/2024 2214 by Wendie Wright RN  Outcome: Progressing  Flowsheets (Taken 12/16/2024 1158 by Mariella Olivo, RN)  Discharge to home or other facility with appropriate resources:   Identify barriers to discharge with patient and caregiver   Identify discharge learning needs (meds, wound care, etc)   Refer to discharge planning if patient needs post-hospital services based on physician order or complex needs related to functional status, cognitive ability or social support system   Arrange for needed discharge resources and transportation as appropriate     Problem: Pain  Goal: Verbalizes/displays adequate comfort level or baseline comfort level  12/16/2024 2214 by Wendie Wright RN  Outcome: Progressing  Flowsheets (Taken 12/16/2024 1158 by Mariella Olivo, RN)  Verbalizes/displays adequate comfort level or baseline comfort level:   Encourage patient to monitor pain and request assistance   Administer analgesics based on type and severity of pain and evaluate response   Consider cultural and social influences on pain and pain management   Assess pain using appropriate pain scale   Implement non-pharmacological measures as appropriate and evaluate response   Notify Licensed Independent Practitioner if interventions unsuccessful or patient reports new pain     Problem: Safety - Adult  Goal: Free from fall injury  12/16/2024 2214 by Wendie Wright, RN  Outcome: Progressing  Flowsheets (Taken 12/16/2024 1158 by Mariella Olivo, RN)  Free From Fall Injury: Instruct family/caregiver on patient safety     Problem: ABCDS Injury Assessment  Goal: Absence of physical injury  12/16/2024 2214 by Wendie Wright, RN  Outcome: Progressing  Flowsheets (Taken 12/16/2024 2214)  Absence of Physical Injury: Implement safety measures based on patient assessment     Problem: Respiratory - Adult  Goal: Achieves optimal 
  Problem: Discharge Planning  Goal: Discharge to home or other facility with appropriate resources  12/17/2024 0823 by Mariella Olivo, RN  Outcome: Progressing  Flowsheets (Taken 12/17/2024 0822)  Discharge to home or other facility with appropriate resources:   Identify barriers to discharge with patient and caregiver   Identify discharge learning needs (meds, wound care, etc)   Refer to discharge planning if patient needs post-hospital services based on physician order or complex needs related to functional status, cognitive ability or social support system   Arrange for needed discharge resources and transportation as appropriate  12/16/2024 2214 by Wendie Wright, RN  Outcome: Progressing  Flowsheets (Taken 12/16/2024 1158 by Mariella Olivo, RN)  Discharge to home or other facility with appropriate resources:   Identify barriers to discharge with patient and caregiver   Identify discharge learning needs (meds, wound care, etc)   Refer to discharge planning if patient needs post-hospital services based on physician order or complex needs related to functional status, cognitive ability or social support system   Arrange for needed discharge resources and transportation as appropriate     Problem: Pain  Goal: Verbalizes/displays adequate comfort level or baseline comfort level  12/17/2024 0823 by Mariella Olivo, RN  Outcome: Progressing  Flowsheets (Taken 12/17/2024 0822)  Verbalizes/displays adequate comfort level or baseline comfort level:   Encourage patient to monitor pain and request assistance   Administer analgesics based on type and severity of pain and evaluate response   Consider cultural and social influences on pain and pain management   Assess pain using appropriate pain scale   Implement non-pharmacological measures as appropriate and evaluate response   Notify Licensed Independent Practitioner if interventions unsuccessful or patient reports new pain  12/16/2024 2214 by Wendie Wright, 
  Problem: Discharge Planning  Goal: Discharge to home or other facility with appropriate resources  Outcome: Progressing     Problem: Pain  Goal: Verbalizes/displays adequate comfort level or baseline comfort level  Outcome: Progressing     Problem: Safety - Adult  Goal: Free from fall injury  Outcome: Progressing     Problem: ABCDS Injury Assessment  Goal: Absence of physical injury  Outcome: Progressing     Problem: Skin/Tissue Integrity - Adult  Goal: Incisions, wounds, or drain sites healing without S/S of infection  Outcome: Progressing     Problem: Musculoskeletal - Adult  Goal: Return mobility to safest level of function  Outcome: Progressing     Problem: Gastrointestinal - Adult  Goal: Maintains or returns to baseline bowel function  Outcome: Progressing     Problem: Infection - Adult  Goal: Absence of infection at discharge  Outcome: Progressing     Problem: Metabolic/Fluid and Electrolytes - Adult  Goal: Electrolytes maintained within normal limits  Outcome: Progressing     
  Problem: Discharge Planning  Goal: Discharge to home or other facility with appropriate resources  Outcome: Progressing   See SW note  
  Problem: Discharge Planning  Goal: Discharge to home or other facility with appropriate resources  Outcome: Progressing  Flowsheets (Taken 12/14/2024 1607)  Discharge to home or other facility with appropriate resources:   Identify barriers to discharge with patient and caregiver   Identify discharge learning needs (meds, wound care, etc)   Refer to discharge planning if patient needs post-hospital services based on physician order or complex needs related to functional status, cognitive ability or social support system   Arrange for needed discharge resources and transportation as appropriate     Problem: Pain  Goal: Verbalizes/displays adequate comfort level or baseline comfort level  Outcome: Progressing  Flowsheets (Taken 12/14/2024 1607)  Verbalizes/displays adequate comfort level or baseline comfort level:   Encourage patient to monitor pain and request assistance   Administer analgesics based on type and severity of pain and evaluate response   Consider cultural and social influences on pain and pain management   Assess pain using appropriate pain scale   Implement non-pharmacological measures as appropriate and evaluate response   Notify Licensed Independent Practitioner if interventions unsuccessful or patient reports new pain     Problem: Safety - Adult  Goal: Free from fall injury  Outcome: Progressing  Flowsheets (Taken 12/14/2024 1607)  Free From Fall Injury: Instruct family/caregiver on patient safety     Problem: ABCDS Injury Assessment  Goal: Absence of physical injury  Outcome: Progressing  Flowsheets (Taken 12/14/2024 1607)  Absence of Physical Injury: Implement safety measures based on patient assessment     Problem: Respiratory - Adult  Goal: Achieves optimal ventilation and oxygenation  Outcome: Progressing  Flowsheets (Taken 12/14/2024 1607)  Achieves optimal ventilation and oxygenation:   Assess for changes in respiratory status   Assess for changes in mentation and behavior     Problem: 
  Problem: Discharge Planning  Goal: Discharge to home or other facility with appropriate resources  Outcome: Progressing  Flowsheets (Taken 12/14/2024 2108 by Renetta Armas, RN)  Discharge to home or other facility with appropriate resources:   Identify barriers to discharge with patient and caregiver   Arrange for needed discharge resources and transportation as appropriate   Identify discharge learning needs (meds, wound care, etc)     Problem: Pain  Goal: Verbalizes/displays adequate comfort level or baseline comfort level  Outcome: Progressing  Flowsheets (Taken 12/15/2024 2232)  Verbalizes/displays adequate comfort level or baseline comfort level:   Encourage patient to monitor pain and request assistance   Administer analgesics based on type and severity of pain and evaluate response   Consider cultural and social influences on pain and pain management   Assess pain using appropriate pain scale   Implement non-pharmacological measures as appropriate and evaluate response     Problem: Safety - Adult  Goal: Free from fall injury  Outcome: Progressing  Flowsheets (Taken 12/15/2024 2232)  Free From Fall Injury: Instruct family/caregiver on patient safety     Problem: ABCDS Injury Assessment  Goal: Absence of physical injury  Outcome: Progressing  Flowsheets (Taken 12/14/2024 2108 by Renetta Armas, RN)  Absence of Physical Injury: Implement safety measures based on patient assessment     Problem: Respiratory - Adult  Goal: Achieves optimal ventilation and oxygenation  Outcome: Progressing  Flowsheets (Taken 12/15/2024 2232)  Achieves optimal ventilation and oxygenation:   Assess for changes in respiratory status   Assess for changes in mentation and behavior     Problem: Skin/Tissue Integrity - Adult  Goal: Incisions, wounds, or drain sites healing without S/S of infection  Outcome: Progressing  Flowsheets (Taken 12/15/2024 2232)  Incisions, Wounds, or Drain Sites Healing Without Sign and Symptoms of 
needed     Problem: Infection - Adult  Goal: Absence of infection at discharge  12/13/2024 1812 by Sheldon Mancilla LPN  Outcome: Progressing  Flowsheets (Taken 12/13/2024 1812)  Absence of infection at discharge:   Assess and monitor for signs and symptoms of infection   Monitor lab/diagnostic results   Administer medications as ordered   Instruct and encourage patient and family to use good hand hygiene technique     Problem: Metabolic/Fluid and Electrolytes - Adult  Goal: Electrolytes maintained within normal limits  12/13/2024 1812 by Sheldon Mancilla LPN  Outcome: Progressing  Flowsheets (Taken 12/13/2024 1812)  Electrolytes maintained within normal limits: Monitor labs and assess patient for signs and symptoms of electrolyte imbalances     Problem: Hematologic - Adult  Goal: Maintains hematologic stability  12/13/2024 1812 by Sheldon Mancilla LPN  Outcome: Progressing  Flowsheets (Taken 12/13/2024 1812)  Maintains hematologic stability:   Assess for signs and symptoms of bleeding or hemorrhage   Monitor labs for bleeding or clotting disorders

## 2024-12-19 NOTE — DISCHARGE SUMMARY
[address] [address]                            DISCHARGE SUMMARY      PATIENT NAME: KIM RODRIGUEZ             : 1950  MED REC NO: [id number]                       ROOM: [facility]  ACCOUNT NO: [id number]                       ADMIT DATE: 2024  PROVIDER: BHUPENDRA Goodson      DISCHARGE DIAGNOSES:    1. Lumbar spinal stenosis.  2. Lumbar spondylolisthesis.  3. Lumbar radiculopathy.    OPERATION PERFORMED:  L2 through S1 lumbar laminectomy with Dr. Perry on the date of 2024.    HOSPITAL COURSE:  The patient is a 74-year-old female who presented to our office having significant low back pain and bilateral hip and leg radicular pain with significant limitation on mobility.  She had failed to improve despite conservative management and elected to proceed with further operative management.  She underwent surgery with Dr. Perry on 2024, and postoperatively was admitted to [facility], .  She was seen on postop day 1 and was doing generally well.  She had some lower extremity nerve pain and back pain as expected.  Her hemoglobin is 8.3, and there was some concern with using IV pain medication due to the lowering of her blood pressure.  She began working with Physical Therapy and Occupational Therapy.  We understood that the patient did not have much help at home and knew that placement would be necessary.  We did consult inpatient rehab and considered discharge to a skilled nursing facility.  Initially, there was a denial for inpatient rehab, then we considered to Transitional Care Unit at [facility], which did not have any room availability.  She was able to progress with physical therapy and showed progressive improvement with her pain control and mobility over the next several days.  We did get to a point where she was mobilizing well and an approval for [facility] was made.  She was finally able to

## 2024-12-20 ENCOUNTER — TELEPHONE (OUTPATIENT)
Dept: FAMILY MEDICINE CLINIC | Age: 74
End: 2024-12-20

## 2024-12-20 NOTE — TELEPHONE ENCOUNTER
Care Transitions Initial Follow Up Call    Outreach made within 2 business days of discharge: No    Patient: Nancy Chavez Patient : 1950   MRN: [id number]  Reason for Admission: lumbar surgery  Discharge Date: 24       Spoke with: n/a not a current pt. Last seen in     Discharge department/facility: [facility] Interactive Patient Contact:      Additional needs identified to be addressed with provider  Not a current pt.              Scheduled appointment with PCP within 7-14 days    Follow Up  No future appointments.    Taylor Kocher, CMA (AAMA)

## 2025-07-15 NOTE — PROGRESS NOTES
Neck Circumference - 13.50    Mallampati - 1    Lung Nodule Screening     [] Qualifies    [x] Does not qualify   [] Declined    [] Completed Statement Selected

## (undated) DEVICE — Device

## (undated) DEVICE — 1LYRTR 16FR10ML100%SILTMPS SNP: Brand: MEDLINE INDUSTRIES, INC.

## (undated) DEVICE — BLADE ES L6IN ELASTOMERIC COAT EXT DURABLE BEND UPTO 90DEG

## (undated) DEVICE — MARKER SURG PASS SPHR NDI

## (undated) DEVICE — TAPE,ELASTIC,FOAM,CURAD,4"X5.5YD,LF: Brand: CURAD

## (undated) DEVICE — DRESSING,GAUZE,XEROFORM,CURAD,5"X9",ST: Brand: CURAD

## (undated) DEVICE — GOWN,SIRUS,NONRNF,SETINSLV,XL,20/CS: Brand: MEDLINE

## (undated) DEVICE — 450 ML BOTTLE OF 0.05% CHLORHEXIDINE GLUCONATE IN 99.95% STERILE WATER FOR IRRIGATION, USP AND APPLICATOR.: Brand: IRRISEPT ANTIMICROBIAL WOUND LAVAGE

## (undated) DEVICE — SUTURE VICRYL + SZ 2-0 L27IN ABSRB UD CP-1 1/2 CIR REV CUT VCP266H

## (undated) DEVICE — SUTURE N ABSRB MONOFILAMENT 2-0 FSLX 75 CM 36 MM ETHILON

## (undated) DEVICE — GLOVE SURG SZ 9 THK91MIL LTX FREE SYN POLYISOPRENE ANTI

## (undated) DEVICE — SET AUTOTRNS C175ML BOWL BTM OUTLT RESERVOIRXTRA

## (undated) DEVICE — GLOVE SURG SZ 65 THK91MIL LTX FREE SYN POLYISOPRENE

## (undated) DEVICE — AGENT HEMOSTATIC SURGIFLOW MATRIX KIT W/THROMBIN

## (undated) DEVICE — GOWN,SIRUS,NON REINFRCD,LARGE,SET IN SL: Brand: MEDLINE

## (undated) DEVICE — SUTURE VICRYL ABSRB BRAID COAT UD CP NO 2 27IN  J195H

## (undated) DEVICE — C-ARMOR C-ARM EQUIPMENT COVERS CLEAR STERILE UNIVERSAL FIT 12 PER CASE: Brand: C-ARMOR

## (undated) DEVICE — DRAIN SURG 10FR PVC TB W/ TRCR MID PERF NO RESVR HUBLESS

## (undated) DEVICE — SPK10281 JACKSON TABLE KIT: Brand: SPK10281 JACKSON TABLE KIT

## (undated) DEVICE — SUTURE ETHILON SZ 3-0 L18IN NONABSORBABLE BLK L24MM FS-1 3/8 663G

## (undated) DEVICE — KIT EVAC 400CC PVC RADPQ Y CONN

## (undated) DEVICE — DRESSING TRNSPAR W8XL12IN FLM SURESITE 123

## (undated) DEVICE — RESERVOIR BLD COLLCTN BTM OUTLETXTRAXRES

## (undated) DEVICE — SPONGE DRN W4XL4IN RAYON/POLYESTER 6 PLY NONWOVEN PRECUT 2 PER PK

## (undated) DEVICE — PROBE STIM 3 MM FOR PEDCL SCREW DISP

## (undated) DEVICE — LINE ASPIR 1/4 ANTICOAG

## (undated) DEVICE — PACK-MAJOR

## (undated) DEVICE — CATHETER IV 16GA L1.16IN OD1.7018-1.7780MM

## (undated) DEVICE — SPONGE,PEANUT,XRAY,ST,SM,3/8",5/CARD: Brand: MEDLINE INDUSTRIES, INC.

## (undated) DEVICE — SPONGE,NEURO,1"X1",XR,STRL,LF,10/PK: Brand: MEDLINE

## (undated) DEVICE — SPONGE LAP W18XL18IN WHT COT 4 PLY FLD STRUNG RADPQ DISP ST 2 PER PACK

## (undated) DEVICE — PAD OP RM W20XL72XH2IN PRECIS CUT FLAT EC BIOCLINIC

## (undated) DEVICE — BIPOLAR SEALER 23-112-1 AQM 6.0: Brand: AQUAMANTYS ®

## (undated) DEVICE — SPONGE GZ W4XL4IN COT 12 PLY TYP VII WVN C FLD DSGN STERILE

## (undated) DEVICE — BUR RND FLUT AGRSV 5MM